# Patient Record
Sex: MALE | Race: ASIAN | NOT HISPANIC OR LATINO | Employment: UNEMPLOYED | URBAN - METROPOLITAN AREA
[De-identification: names, ages, dates, MRNs, and addresses within clinical notes are randomized per-mention and may not be internally consistent; named-entity substitution may affect disease eponyms.]

---

## 2017-01-06 ENCOUNTER — COMMUNICATION - HEALTHEAST (OUTPATIENT)
Dept: NURSING | Facility: CLINIC | Age: 22
End: 2017-01-06

## 2017-01-09 ENCOUNTER — COMMUNICATION - HEALTHEAST (OUTPATIENT)
Dept: NURSING | Facility: CLINIC | Age: 22
End: 2017-01-09

## 2017-01-09 ENCOUNTER — COMMUNICATION - HEALTHEAST (OUTPATIENT)
Dept: CARE COORDINATION | Facility: CLINIC | Age: 22
End: 2017-01-09

## 2017-01-09 DIAGNOSIS — Z71.89 COUNSELING AND COORDINATION OF CARE: ICD-10-CM

## 2017-01-13 ENCOUNTER — COMMUNICATION - HEALTHEAST (OUTPATIENT)
Dept: NURSING | Facility: CLINIC | Age: 22
End: 2017-01-13

## 2017-01-13 ENCOUNTER — AMBULATORY - HEALTHEAST (OUTPATIENT)
Dept: CARE COORDINATION | Facility: CLINIC | Age: 22
End: 2017-01-13

## 2017-01-13 ENCOUNTER — COMMUNICATION - HEALTHEAST (OUTPATIENT)
Dept: CARE COORDINATION | Facility: CLINIC | Age: 22
End: 2017-01-13

## 2017-01-17 ENCOUNTER — COMMUNICATION - HEALTHEAST (OUTPATIENT)
Dept: CARE COORDINATION | Facility: CLINIC | Age: 22
End: 2017-01-17

## 2017-01-19 ENCOUNTER — COMMUNICATION - HEALTHEAST (OUTPATIENT)
Dept: CARE COORDINATION | Facility: CLINIC | Age: 22
End: 2017-01-19

## 2017-01-20 ENCOUNTER — COMMUNICATION - HEALTHEAST (OUTPATIENT)
Dept: CARE COORDINATION | Facility: CLINIC | Age: 22
End: 2017-01-20

## 2017-01-23 ENCOUNTER — COMMUNICATION - HEALTHEAST (OUTPATIENT)
Dept: CARE COORDINATION | Facility: CLINIC | Age: 22
End: 2017-01-23

## 2017-01-26 ENCOUNTER — COMMUNICATION - HEALTHEAST (OUTPATIENT)
Dept: CARE COORDINATION | Facility: CLINIC | Age: 22
End: 2017-01-26

## 2017-01-27 ENCOUNTER — COMMUNICATION - HEALTHEAST (OUTPATIENT)
Dept: NURSING | Facility: CLINIC | Age: 22
End: 2017-01-27

## 2017-01-27 ENCOUNTER — COMMUNICATION - HEALTHEAST (OUTPATIENT)
Dept: CARE COORDINATION | Facility: CLINIC | Age: 22
End: 2017-01-27

## 2017-01-31 ENCOUNTER — AMBULATORY - HEALTHEAST (OUTPATIENT)
Dept: CARE COORDINATION | Facility: CLINIC | Age: 22
End: 2017-01-31

## 2017-03-13 ENCOUNTER — COMMUNICATION - HEALTHEAST (OUTPATIENT)
Dept: NURSING | Facility: CLINIC | Age: 22
End: 2017-03-13

## 2017-04-21 ENCOUNTER — COMMUNICATION - HEALTHEAST (OUTPATIENT)
Dept: NURSING | Facility: CLINIC | Age: 22
End: 2017-04-21

## 2017-05-03 ENCOUNTER — COMMUNICATION - HEALTHEAST (OUTPATIENT)
Dept: NURSING | Facility: CLINIC | Age: 22
End: 2017-05-03

## 2017-07-14 ENCOUNTER — COMMUNICATION - HEALTHEAST (OUTPATIENT)
Dept: NURSING | Facility: CLINIC | Age: 22
End: 2017-07-14

## 2017-09-07 ENCOUNTER — OFFICE VISIT - HEALTHEAST (OUTPATIENT)
Dept: FAMILY MEDICINE | Facility: CLINIC | Age: 22
End: 2017-09-07

## 2017-09-07 DIAGNOSIS — F15.20 METHAMPHETAMINE USE DISORDER, MODERATE, DEPENDENCE (H): ICD-10-CM

## 2017-09-07 DIAGNOSIS — B19.10 HEPATITIS B: ICD-10-CM

## 2017-09-07 DIAGNOSIS — F12.10 CANNABIS ABUSE: ICD-10-CM

## 2017-09-07 DIAGNOSIS — F19.951: ICD-10-CM

## 2017-09-07 DIAGNOSIS — F19.20 POLYSUBSTANCE DEPENDENCE (H): ICD-10-CM

## 2017-09-07 ASSESSMENT — MIFFLIN-ST. JEOR: SCORE: 1342.06

## 2017-12-20 ENCOUNTER — DOCUMENTATION ONLY (OUTPATIENT)
Dept: PSYCHOLOGY | Facility: CLINIC | Age: 22
End: 2017-12-20

## 2017-12-20 ENCOUNTER — OFFICE VISIT (OUTPATIENT)
Dept: FAMILY MEDICINE | Facility: CLINIC | Age: 22
End: 2017-12-20
Payer: COMMERCIAL

## 2017-12-20 VITALS
WEIGHT: 116.8 LBS | BODY MASS INDEX: 21.49 KG/M2 | DIASTOLIC BLOOD PRESSURE: 89 MMHG | HEART RATE: 106 BPM | HEIGHT: 62 IN | TEMPERATURE: 97.8 F | SYSTOLIC BLOOD PRESSURE: 126 MMHG

## 2017-12-20 DIAGNOSIS — F20.9 SCHIZOPHRENIA, UNSPECIFIED TYPE (H): ICD-10-CM

## 2017-12-20 DIAGNOSIS — Z00.00 ROUTINE GENERAL MEDICAL EXAMINATION AT A HEALTH CARE FACILITY: Primary | ICD-10-CM

## 2017-12-20 LAB
% GRANULOCYTES: 75.4 %G (ref 40–75)
ALBUMIN SERPL-MCNC: 4.6 MG/DL (ref 3.9–5.1)
ALP SERPL-CCNC: 67.3 U/L (ref 40–150)
ALT SERPL-CCNC: 25.9 U/L (ref 0–45)
AST SERPL-CCNC: 26.3 U/L (ref 0–55)
BILIRUB SERPL-MCNC: 0.4 MG/DL (ref 0.2–1.3)
BUN SERPL-MCNC: 14.4 MG/DL (ref 7–21)
CALCIUM SERPL-MCNC: 9.5 MG/DL (ref 8.5–10.1)
CHLORIDE SERPLBLD-SCNC: 103.6 MMOL/L (ref 98–110)
CHOLEST SERPL-MCNC: 105.9 MG/DL (ref 0–200)
CHOLEST/HDLC SERPL: 2.9 {RATIO} (ref 0–5)
CO2 SERPL-SCNC: 29.2 MMOL/L (ref 20–32)
CREAT SERPL-MCNC: 1.2 MG/DL (ref 0.7–1.3)
GFR SERPL CREATININE-BSD FRML MDRD: 80.5 ML/MIN/1.7 M2
GLUCOSE SERPL-MCNC: 79.6 MG'DL (ref 70–99)
GRANULOCYTES #: 5.1 K/UL (ref 1.6–8.3)
HCT VFR BLD AUTO: 52.4 % (ref 40–53)
HDLC SERPL-MCNC: 36.4 MG/DL
HEMOGLOBIN: 16 G/DL (ref 13.3–17.7)
LDLC SERPL CALC-MCNC: 55 MG/DL (ref 0–129)
LYMPHOCYTES # BLD AUTO: 1.3 K/UL (ref 0.8–5.3)
LYMPHOCYTES NFR BLD AUTO: 18.5 %L (ref 20–48)
MCH RBC QN AUTO: 23.3 PG (ref 26.5–35)
MCHC RBC AUTO-ENTMCNC: 30.5 G/DL (ref 32–36)
MCV RBC AUTO: 76.2 FL (ref 78–100)
MID #: 0.4 K/UL (ref 0–2.2)
MID %: 6.1 %M (ref 0–20)
PLATELET # BLD AUTO: 381 K/UL (ref 150–450)
POTASSIUM SERPL-SCNC: 4.3 MMOL/DL (ref 3.2–4.6)
PROT SERPL-MCNC: 7.3 G/DL (ref 6.8–8.8)
RBC # BLD AUTO: 6.9 M/UL (ref 4.4–5.9)
SODIUM SERPL-SCNC: 139.8 MMOL/L (ref 132–142)
TRIGL SERPL-MCNC: 73.3 MG/DL (ref 0–150)
TSH SERPL DL<=0.05 MIU/L-ACNC: 0.68 UIU/ML (ref 0.3–5)
VLDL CHOLESTEROL: 14.7 MG/DL (ref 7–32)
WBC # BLD AUTO: 6.8 K/UL (ref 4–11)

## 2017-12-20 RX ORDER — ARIPIPRAZOLE 15 MG/1
15 TABLET ORAL AT BEDTIME
Qty: 30 TABLET | Refills: 0 | Status: SHIPPED | OUTPATIENT
Start: 2017-12-20 | End: 2020-11-10

## 2017-12-20 NOTE — LETTER
December 21, 2017      Moose Laurarodrigo [facility] [address]        Dear Moose,    Please see below for your test results.  state that your labs are normal. We will continue to monitor these levels while taking certain meds. Please call us with any questions     Resulted Orders   CBC with Diff Plt ([facility])   Result Value Ref Range    WBC 6.8 4.0 - 11.0 K/uL    Lymphocytes # 1.3 0.8 - 5.3 K/uL    % Lymphocytes 18.5 (L) 20.0 - 48.0 %L    Mid # 0.4 0.0 - 2.2 K/uL    Mid % 6.1 0.0 - 20.0 %M    GRANULOCYTES # 5.1 1.6 - 8.3 K/uL    % Granulocytes 75.4 (H) 40.0 - 75.0 %G    RBC 6.9 (H) 4.4 - 5.9 M/uL    Hemoglobin 16.0 13.3 - 17.7 g/dL    Hematocrit 52.4 40.0 - 53.0 %    MCV 76.2 (L) 78.0 - 100.0 fL    MCH 23.3 (L) 26.5 - 35.0    MCHC 30.5 (L) 32.0 - 36.0 g/dL    Platelets 381.0 150.0 - 450.0 K/uL   Thyroid [place] ([facility])   Result Value Ref Range    TSH 0.68 0.30 - 5.00 uIU/mL    Narrative    Test performed by:  [facility] [address] [place] [address]   Lipid Panel ([facility])   Result Value Ref Range    Cholesterol 105.9 0.0 - 200.0 mg/dL    Cholesterol/HDL Ratio 2.9 0.0 - 5.0    HDL Cholesterol 36.4 (L) >40.0 mg/dL    LDL Cholesterol Calculated 55 0 - 129 mg/dL    Triglycerides 73.3 0.0 - 150.0 mg/dL    VLDL Cholesterol 14.7 7.0 - 32.0 mg/dL   Comprehensive Metabolic Panel ([place])   Result Value Ref Range    Albumin 4.6 3.9 - 5.1 mg/dL    Alkaline Phosphatase 67.3 40.0 - 150.0 U/L    ALT 25.9 0.0 - 45.0 U/L    AST 26.3 0.0 - 55.0 U/L    Bilirubin Total 0.4 0.2 - 1.3 mg/dL    Urea Nitrogen 14.4 7.0 - 21.0 mg/dL    Calcium 9.5 8.5 - 10.1 mg/dL    Chloride 103.6 98.0 - 110.0 mmol/L    Carbon Dioxide 29.2 20.0 - 32.0 mmol/L    Creatinine 1.2 0.7 - 1.3 mg/dL    Glucose 79.6 70.0 - 99.0 mg'dL    Potassium 4.3 3.2 - 4.6 mmol/dL    Sodium 139.8 132.0 - 142.0 mmol/L    Protein Total 7.3 6.8 - 8.8 g/dL    GFR Estimate 80.5 >60.0 mL/min/1.7 m2    GFR Estimate If Black >90 >60.0 mL/min/1.7 m2        If you have any questions, please call the clinic to make an appointment.    Sincerely,    Tim Chowdhury, DO

## 2017-12-20 NOTE — PROGRESS NOTES
Preceptor attestation:  Patient seen and discussed with the resident. Assessment and plan reviewed with resident and agreed upon.  Supervising physician: Adryan Pang  [facility]
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
## 2017-12-20 NOTE — MR AVS SNAPSHOT
After Visit Summary   12/20/2017    Moose Sanchez    MRN: [id number]           Patient Information     Date Of Birth          1995        Visit Information        Provider Department      12/20/2017 8:00 AM Tim Chowdhury DO [facility]        Today's Diagnoses     Routine general medical examination at a health care facility    -  1    Schizophrenia, unspecified type (H)          Care Instructions      Preventive Health Recommendations  Male Ages 18 - 25     Yearly exam:             See your health care provider every year in order to  o   Review health changes.   o   Discuss preventive care.    o   Review your medicines if your doctor has prescribed any.    You should be tested each year for STDs (sexually transmitted diseases).     Talk to your provider about cholesterol testing.      If you are at risk for diabetes, you should have a diabetes test (fasting glucose).    Shots: Get a flu shot each year. Get a tetanus shot every 10 years.     Nutrition:    Eat at least 5 servings of fruits and vegetables daily.     Eat whole-grain bread, whole-wheat pasta and brown rice instead of white grains and rice.     Talk to your provider about calcium and Vitamin D.     Lifestyle    Exercise for at least 150 minutes a week (30 minutes a day, 5 days a week). This will help you control your weight and prevent disease.     Limit alcohol to one drink per day.     No smoking.     Wear sunscreen to prevent skin cancer.     See your dentist every six months for an exam and cleaning.             Follow-ups after your visit        Additional Services     MENTAL HEALTH REFERRAL  -       Use this form for behavioral health consults and assessments. The referral coordinator will help to determine whether patients are best served by clinic behavioral health staff or by community providers.    Presenting Problem: Schizophrenia vs. Psychotic disorder. Patient is followed by Dr. Yusuf with [facility], rereferral back to  "[facility]    Currently having suicidal thoughts: No  Previous psych hospitalization: Yes    Type of referral(s) requested (indicate all that apply):  Adult Psychiatry--for long term medication management., needing a higher level of care than primary care can provide        needed:Yes  Language: Eve                  Who to contact     Please call your clinic at [phone] to:    Ask questions about your health    Make or cancel appointments    Discuss your medicines    Learn about your test results    Speak to your doctor   If you have compliments or concerns about an experience at your clinic, or if you wish to file a complaint, please contact [facility] Patient Relations at [phone] or email us at [email or web address]         Additional Information About Your Visit        5app Information     5app is an electronic gateway that provides easy, online access to your medical records. With 5app, you can request a clinic appointment, read your test results, renew a prescription or communicate with your care team.     To sign up for 5app visit the website at [email or web address]   You will be asked to enter the access code listed below, as well as some personal information. Please follow the directions to create your username and password.     Your access code is: [email or web address]  Expires: 3/20/2018  9:04 AM     Your access code will  in 90 days. If you need help or a new code, please contact your [facility] or call [phone] for assistance.        Care EveryWhere ID     This is your Care EveryWhere ID. This could be used by other organizations to access your [facility] medical records  [id number]        Your Vitals Were     Pulse Temperature Height BMI (Body Mass Index)          106 97.8  F (36.6  C) (Oral) 5' 1.81\" (157 cm) 21.49 kg/m2         Blood Pressure from Last 3 Encounters:   17 126/89    Weight from " Last 3 Encounters:   12/20/17 116 lb 12.8 oz (53 kg)              We Performed the Following     CBC with Diff Plt ([facility])     Comprehensive Metabolic Panel ([place])     Lipid Panel ([facility])     MENTAL HEALTH REFERRAL  -     Thyroid [place] ([facility])          Today's Medication Changes          These changes are accurate as of: 12/20/17  9:04 AM.  If you have any questions, ask your nurse or doctor.               Start taking these medicines.        Dose/Directions    ARIPiprazole 15 MG tablet   Commonly known as:  ABILIFY   Used for:  Schizophrenia, unspecified type (H)   Started by:  Tim Chowdhury DO        Dose:  15 mg   Take 1 tablet (15 mg) by mouth At Bedtime   Quantity:  30 tablet   Refills:  0            Where to get your medicines      These medications were sent to [facility] - [place], [place] - [address], [place] [address]     Phone:  [phone]     ARIPiprazole 15 MG tablet                Primary Care Provider Office Phone # Fax #    Tim Chowdhury [phone]       [address] [address]        Equal Access to Services     FANNY COLLAZO : Hadii joel gamez hadasho Soomaali, waaxda luqadaha, qaybta kaalmada adejaneen, lynn gomes. So [facility] [phone].    ATENCIÓN: Si habla español, tiene a nesbitt disposición servicios gratuitos de asistencia lingüística. Leeann al [phone].    We comply with applicable federal civil rights laws and [place] laws. We do not discriminate on the basis of race, color, national origin, age, disability, sex, sexual orientation, or gender identity.            Thank you!     Thank you for choosing [facility]  for your care. Our goal is always to provide you with excellent care. Hearing back from our patients is one way we can continue to improve our services. Please take a few minutes to complete the written survey that you may receive in the mail after  your visit with us. Thank you!             Your Updated Medication List - Protect others around you: Learn how to safely use, store and throw away your medicines at www.disposemymeds.org.          This list is accurate as of: 12/20/17  9:04 AM.  Always use your most recent med list.                   Brand Name Dispense Instructions for use Diagnosis    ARIPiprazole 15 MG tablet    ABILIFY    30 tablet    Take 1 tablet (15 mg) by mouth At Bedtime    Schizophrenia, unspecified type (H)

## 2017-12-20 NOTE — PATIENT INSTRUCTIONS
Preventive Health Recommendations  Male Ages 18 - 25     Yearly exam:             See your health care provider every year in order to  o   Review health changes.   o   Discuss preventive care.    o   Review your medicines if your doctor has prescribed any.    You should be tested each year for STDs (sexually transmitted diseases).     Talk to your provider about cholesterol testing.      If you are at risk for diabetes, you should have a diabetes test (fasting glucose).    Shots: Get a flu shot each year. Get a tetanus shot every 10 years.     Nutrition:    Eat at least 5 servings of fruits and vegetables daily.     Eat whole-grain bread, whole-wheat pasta and brown rice instead of white grains and rice.     Talk to your provider about calcium and Vitamin D.     Lifestyle    Exercise for at least 150 minutes a week (30 minutes a day, 5 days a week). This will help you control your weight and prevent disease.     Limit alcohol to one drink per day.     No smoking.     Wear sunscreen to prevent skin cancer.     See your dentist every six months for an exam and cleaning.   See documentation encounter for details on mental health referral.  Mallory  12/20/17

## 2017-12-20 NOTE — PROGRESS NOTES
"Given challenges getting this patient connected to psychiatry in the community, I am wondering if we need to connect him to [facility] ([phone]).   Did call there for consultation on this possible referral.  Spoke with Consuelo.  She was initially unsure if they could take this referral and noted they would need advanced notice to schedule an  if he were to be seen at the Urgent Care Center itself.  Shared concerns related to recent hospitalization for homicidal ideation and psychotic symptoms.  Noted he was on a wait list for psychiatry at [facility] but this could be up to 4 months away.  Ultimately, per our phone consultation today, we decided it would be best if they provide a \"service offering\" in which they see the patient in his home, triage the situation and develop a care plan, but this will likely not happen until after Yasmani.  Agreed he may benefit from mental health case management if they could assist him in getting connected to this service.Thanked Consuelo for her willingness to reach out to the patient and his family and provided contact information for the patient and his family (including Dad's name).  Asked for call back on disposition if possible and Consuelo agreed to update us or let us know if they had difficulty reaching the patient.      "

## 2017-12-20 NOTE — PROGRESS NOTES
This patient was being referred to [facility] for psychiatry, however, when I called, they state that because he has not been seen in over a year, he would be considered a new patient. They are not accepting new patients. Also, they noted that patient had 5 no shows with them as well.     I have called [facility], patient is unable to be seen with them due to too many no shows.     I then called [facility], patient has been placed on a wait list for psychiatry (2-4 months out). I have also left a message for their diagnostic assessment schedulers to get that going in the meantime.     I am thinking this patient would benefit greatly from more support from [facility] in addition to these services as it seems that there have been many barriers from getting to appointments and being constant with mental health services. Please advise on this.    Mallory  12/20/17

## 2017-12-21 NOTE — PROGRESS NOTES
Received call back from Consuelo that she was able to identify that he has a mental health , John Gallagher ([phone]) via [facility].  I will add this provider to the care team and note in the snapshot that we should get [facility] for this provider ASAP.  Consuelo reported in her message that they would still do outreach to Mr. Sanchez, but recommended coordinating our efforts to connect him to psychiatry with his current .

## 2018-01-02 PROBLEM — B18.1 CHRONIC VIRAL HEPATITIS B WITHOUT DELTA AGENT AND WITHOUT COMA (H): Status: ACTIVE | Noted: 2018-01-02

## 2018-02-15 ENCOUNTER — OFFICE VISIT (OUTPATIENT)
Dept: FAMILY MEDICINE | Facility: CLINIC | Age: 23
End: 2018-02-15
Payer: COMMERCIAL

## 2018-02-15 VITALS
DIASTOLIC BLOOD PRESSURE: 78 MMHG | HEART RATE: 106 BPM | TEMPERATURE: 98 F | SYSTOLIC BLOOD PRESSURE: 120 MMHG | HEIGHT: 62 IN | WEIGHT: 118.6 LBS | BODY MASS INDEX: 21.83 KG/M2 | OXYGEN SATURATION: 99 %

## 2018-02-15 DIAGNOSIS — T33.90XA FROSTBITE, INITIAL ENCOUNTER: Primary | ICD-10-CM

## 2018-02-15 DIAGNOSIS — Z23 NEED FOR VACCINATION: ICD-10-CM

## 2018-02-15 RX ORDER — IBUPROFEN 400 MG/1
400 TABLET, FILM COATED ORAL EVERY 6 HOURS PRN
Qty: 120 TABLET | Refills: 1 | Status: SHIPPED | OUTPATIENT
Start: 2018-02-15 | End: 2020-11-10
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
## 2018-02-15 NOTE — PROGRESS NOTES
"Nursing Notes:   Carissa Son CMA  2/15/2018  1:18 PM  Addendum   name: Aayush Pollack  Language: Eve  Agency: [employer]  Phone number: [phone]      Injectable Influenza Immunization Documentation    1.  Has the patient received the information for the injectable influenza vaccine? YES     2. Is the patient 6 months of age or older? YES     3. Does the patient have any of the following contraindications?         Severe allergy to eggs? No     Severe allergic reaction to previous influenza vaccines? No   Severe allergy to latex? No       History of Guillain-[place] syndrome? No     Currently have a temperature greater than 100.4F? No        4.  Severely egg allergic patients should have flu vaccine eligibility assessed by an MD, RN, or pharmacist, and those who received flu vaccine should be observed for 15 min by an MD, RN, Pharmacist, Medical Technician, or member of clinic staff.\": YES    5. Latex-allergic patients should be given latex-free influenza vaccine Yes. Please reference the Vaccine latex table to determine if your clinic s product is latex-containing.       Vaccination given by BERLIN Martinez                  Chief Complaint   Patient presents with     other     index and middle fingers on the left hand are swelling and it's itching pain per patient.      Blood pressure 120/78, pulse 106, temperature 98  F (36.7  C), temperature source Oral, height 5' 1.5\" (1.562 m), weight 118 lb 9.6 oz (53.8 kg), SpO2 99 %.    Assessment and Plan   Frostbite: We are roughly 2 weeks out from initial injury.  Patient is not having significant pain at this time but he can take ibuprofen as needed for that.  Additionally he can apply aloe vera  topically.  We will refer him to the burn clinic at either  [facility] or head opinion to see if any interventions could be offered.    Options for treatment and follow-up care were reviewed with the patient and/or guardian. Moose Sanchez and/or guardian engaged in the " decision making process and verbalized understanding of the options discussed and agreed with the final plan.  Patient discussed with Dr. Briggs.   Sudarshan LINDSAY Rutledge, DO         JOHN       Moose Sanchez is a 22 year old  male S/P who presents today for swelling of the fingers.  Patient notes that his index finger tip and middle fingertip have been discolored and swollen for a few weeks.  He reports that one night is cold outside he was walking for an hour and while walking to his destination he developed the skin changes and swelling in the 2 fingers on the left hand as noted above.  He was not wearing gloves.  He noted swelling pain and then subsequently itching.  He did not have any drainage from the areas.  He has not taken anything for pain.  No fevers chills.  His pain is actually markedly improved and the most concerning thing to him is itching over these digits today.    Patient Active Problem List   Diagnosis     Schizophrenia (H)     Chronic viral hepatitis B without delta agent and without coma (H)     Current Outpatient Prescriptions   Medication Sig Dispense Refill     ibuprofen (ADVIL/MOTRIN) 400 MG tablet Take 1 tablet (400 mg) by mouth every 6 hours as needed for moderate pain 120 tablet 1     Aloe Vera 90 % GEL Externally apply topically 3 times daily Please substitute with alternative if not covered by insurance 1 Bottle 1     ARIPiprazole (ABILIFY) 15 MG tablet Take 1 tablet (15 mg) by mouth At Bedtime 30 tablet 0     No Known Allergies  Family History   Problem Relation Age of Onset     DIABETES No family hx of      Coronary Artery Disease No family hx of      Other Cancer No family hx of      CANCER No family hx of      HEART DISEASE No family hx of      No results found for this or any previous visit (from the past 24 hour(s)).         Review of Systems:   As Above             Physical Exam:     Vitals:    02/15/18 1121   BP: 120/78   Pulse: 106   Temp: 98  F (36.7  C)   TempSrc: Oral   SpO2: 99%  "  Weight: 118 lb 9.6 oz (53.8 kg)   Height: 5' 1.5\" (1.562 m)     Body mass index is 22.05 kg/(m^2).    MSK: Fingertips in the index and middle finger and to a lesser extent 4th digit of the left hand with a mild amount of swelling in blue to black discoloration noted.  Digits are not significantly cooler than other digits.  He does not have pain on palpation of these areas.  He is able to flex and extend at the DIP joints of these fingers without significant difficulty.    Office Visit on 12/20/2017   Component Date Value Ref Range Status     WBC 12/20/2017 6.8  4.0 - 11.0 K/uL Final     Lymphocytes # 12/20/2017 1.3  0.8 - 5.3 K/uL Final     % Lymphocytes 12/20/2017 18.5* 20.0 - 48.0 %L Final     Mid # 12/20/2017 0.4  0.0 - 2.2 K/uL Final     Mid % 12/20/2017 6.1  0.0 - 20.0 %M Final     GRANULOCYTES # 12/20/2017 5.1  1.6 - 8.3 K/uL Final     % Granulocytes 12/20/2017 75.4* 40.0 - 75.0 %G Final     RBC 12/20/2017 6.9* 4.4 - 5.9 M/uL Final     Hemoglobin 12/20/2017 16.0  13.3 - 17.7 g/dL Final     Hematocrit 12/20/2017 52.4  40.0 - 53.0 % Final     MCV 12/20/2017 76.2* 78.0 - 100.0 fL Final     MCH 12/20/2017 23.3* 26.5 - 35.0 Final     MCHC 12/20/2017 30.5* 32.0 - 36.0 g/dL Final     Platelets 12/20/2017 381.0  150.0 - 450.0 K/uL Final     TSH 12/20/2017 0.68  0.30 - 5.00 uIU/mL Final     Cholesterol 12/20/2017 105.9  0.0 - 200.0 mg/dL Final     Cholesterol/HDL Ratio 12/20/2017 2.9  0.0 - 5.0 Final     HDL Cholesterol 12/20/2017 36.4* >40.0 mg/dL Final     LDL Cholesterol Calculated 12/20/2017 55  0 - 129 mg/dL Final     Triglycerides 12/20/2017 73.3  0.0 - 150.0 mg/dL Final     VLDL Cholesterol 12/20/2017 14.7  7.0 - 32.0 mg/dL Final     Albumin 12/20/2017 4.6  3.9 - 5.1 mg/dL Final     Alkaline Phosphatase 12/20/2017 67.3  40.0 - 150.0 U/L Final     ALT 12/20/2017 25.9  0.0 - 45.0 U/L Final     AST 12/20/2017 26.3  0.0 - 55.0 U/L Final     Bilirubin Total 12/20/2017 0.4  0.2 - 1.3 mg/dL Final     Urea Nitrogen " 12/20/2017 14.4  7.0 - 21.0 mg/dL Final     Calcium 12/20/2017 9.5  8.5 - 10.1 mg/dL Final     Chloride 12/20/2017 103.6  98.0 - 110.0 mmol/L Final     Carbon Dioxide 12/20/2017 29.2  20.0 - 32.0 mmol/L Final     Creatinine 12/20/2017 1.2  0.7 - 1.3 mg/dL Final     Glucose 12/20/2017 79.6  70.0 - 99.0 mg'dL Final     Potassium 12/20/2017 4.3  3.2 - 4.6 mmol/dL Final     Sodium 12/20/2017 139.8  132.0 - 142.0 mmol/L Final     Protein Total 12/20/2017 7.3  6.8 - 8.8 g/dL Final     GFR Estimate 12/20/2017 80.5  >60.0 mL/min/1.7 m2 Final     GFR Estimate If Black 12/20/2017 >90  >60.0 mL/min/1.7 m2 Final

## 2018-02-15 NOTE — PROGRESS NOTES
Preceptor attestation:  Patient seen and discussed with the resident. Assessment and plan reviewed with resident and agreed upon.  Supervising physician: Manuela Briggs  [facility]

## 2018-02-15 NOTE — PATIENT INSTRUCTIONS
- can apply aloe vera gel three times a day and take 400 mg ibuprofen as needed   - recommend following up here after burn clinic    [facility]   [phone]   Fax:[phone]  *I've spoken with the clinic and they would like to review referral and notes first. They will contact the patient to schedule then call me and let me know when so I can schedule a ride for them.  Mallory  02/20/18      [facility] has made several attempts to reach patient with no response.  Message sent to provider.  Mallory  02/23/18

## 2018-02-15 NOTE — NURSING NOTE
" name: Aayush Izaiah Pollack  Language: Eve  Agency: [employer]  Phone number: [phone]      Injectable Influenza Immunization Documentation    1.  Has the patient received the information for the injectable influenza vaccine? YES     2. Is the patient 6 months of age or older? YES     3. Does the patient have any of the following contraindications?         Severe allergy to eggs? No     Severe allergic reaction to previous influenza vaccines? No   Severe allergy to latex? No       History of Guillain-[place] syndrome? No     Currently have a temperature greater than 100.4F? No        4.  Severely egg allergic patients should have flu vaccine eligibility assessed by an MD, RN, or pharmacist, and those who received flu vaccine should be observed for 15 min by an MD, RN, Pharmacist, Medical Technician, or member of clinic staff.\": YES    5. Latex-allergic patients should be given latex-free influenza vaccine Yes. Please reference the Vaccine latex table to determine if your clinic s product is latex-containing.       Vaccination given by November Paw, RMA                  "

## 2019-06-19 ENCOUNTER — COMMUNICATION - HEALTHEAST (OUTPATIENT)
Dept: FAMILY MEDICINE | Facility: CLINIC | Age: 24
End: 2019-06-19

## 2019-06-21 ENCOUNTER — COMMUNICATION - HEALTHEAST (OUTPATIENT)
Dept: CARE COORDINATION | Facility: CLINIC | Age: 24
End: 2019-06-21

## 2020-09-11 ENCOUNTER — OFFICE VISIT (OUTPATIENT)
Dept: NEUROLOGY | Facility: CLINIC | Age: 25
End: 2020-09-11
Payer: COMMERCIAL

## 2020-09-11 VITALS
SYSTOLIC BLOOD PRESSURE: 120 MMHG | HEIGHT: 64 IN | HEART RATE: 94 BPM | WEIGHT: 108 LBS | DIASTOLIC BLOOD PRESSURE: 77 MMHG | BODY MASS INDEX: 18.44 KG/M2

## 2020-09-11 DIAGNOSIS — R56.9 SEIZURE (H): Primary | ICD-10-CM

## 2020-09-11 PROBLEM — F19.951 DRUG-INDUCED PSYCHOTIC DISORDER WITH HALLUCINATIONS (H): Status: ACTIVE | Noted: 2019-04-16

## 2020-09-11 PROBLEM — F12.10 CANNABIS ABUSE: Status: ACTIVE | Noted: 2019-04-16

## 2020-09-11 PROBLEM — K21.9 GASTROESOPHAGEAL REFLUX DISEASE: Status: ACTIVE | Noted: 2020-07-14

## 2020-09-11 PROBLEM — J96.00 ACUTE RESPIRATORY FAILURE (H): Status: ACTIVE | Noted: 2019-08-15

## 2020-09-11 PROBLEM — F10.20 ALCOHOL USE DISORDER, SEVERE, DEPENDENCE (H): Status: ACTIVE | Noted: 2018-07-31

## 2020-09-11 PROBLEM — B18.1 CHRONIC HEPATITIS B (H): Status: ACTIVE | Noted: 2018-01-02

## 2020-09-11 PROBLEM — F29 PSYCHOSIS (H): Status: ACTIVE | Noted: 2018-07-24

## 2020-09-11 PROBLEM — F06.30 MOOD DISORDER DUE TO A GENERAL MEDICAL CONDITION: Status: ACTIVE | Noted: 2019-08-15

## 2020-09-11 PROBLEM — F19.94 SUBSTANCE INDUCED MOOD DISORDER (H): Status: ACTIVE | Noted: 2019-08-15

## 2020-09-11 PROBLEM — R79.89 SERUM CREATININE RAISED: Status: ACTIVE | Noted: 2020-07-14

## 2020-09-11 PROBLEM — F15.10 AMPHETAMINE ABUSE (H): Status: ACTIVE | Noted: 2019-08-15

## 2020-09-11 PROCEDURE — 99215 OFFICE O/P EST HI 40 MIN: CPT | Performed by: PSYCHIATRY & NEUROLOGY

## 2020-09-11 ASSESSMENT — MIFFLIN-ST. JEOR: SCORE: 1390.88

## 2020-09-11 NOTE — LETTER
9/11/2020         RE: Moose Sanchez  [address] [place] [address]        Dear Colleague,    Thank you for referring your patient, Moose Sanchez, to the [facility] [place]. Please see a copy of my visit note below.    In person neurologic evaluation  Patient comes with an   Patient really does not know why he is here  Patient does not know what meds he is on but thinks he is on 2 meds  Cannot really give much history or insight into his problems        HPI  24-year-old who on August 8, 2020 had an episode of shaking per his family  EMS transported him to the ER  In the ER he was amnestic for the event  He not had any recent substance abuse  No recent illness  No significant sleep deprivation    His primary did a Keppra level about a week later on August 14, 2020 that was 8.9        Patient who previously been seen in June 2019 a year ago in hospital  Had 4 seizures during that hospitalization thought to be triggered by polysubstance abuse  Was supposedly discharged on Keppra thousand milligrams twice daily  Never came back for follow-up    Patient is a poor historian    Does not recall what meds he is on at this time    Per chart he is on Keppra thousand milligrams twice daily        Past medical history  Psych history with psychosis  Polysubstance abuse (alcohol, methamphetamines, marijuana)  History of hepatitis B  First-time seizure times 4 June 2019 hospitalized at [facility]  4 beers per week  2 shots per week  Smokes cigarettes maybe 3 cigarettes/day  Denies any drug abuse at this time        Work-up June 2019  CT scan head June 2018 normal  MRI scan brain June 2018 with and without contrast normal  EEG June 2019 intubated sedated 3-4 Hz and 5-6 Hz diffuse slowing no epileptiform activity  CSF June 2019, WBC 3/2, RBC 0/0, protein 21, glucose 84, PCR negative for HSV, varicella, enterovirus      Recent work-up August 2020  Keppra level 8.98 August 14, 2020  TSH  1.03  Labs August 8, 2020  Sodium 137 potassium 3.9 BUN 6 creatinine 1.38  Glucose 147  AST 26  White blood count 8.3, hemoglobin 13.9, platelets 346,000      Exam     Current review of systems  No headache no chest pain no shortness of breath no nausea vomiting no diarrhea no fever chills no abdominal pain    No diplopia dysarthria dysphasia  No focal weakness numbness or tingling  No trouble with gait    Otherwise review systems negative              Vital signs stable  Alert oriented x3 through the   HEENT normal no signs of trauma  Lungs clear  Heart rate regular  Abdomen soft  Symmetrical pulses  No edema the feet    Neurologic exam with the   Alert oriented x3  Normal naming normal comprehension normal repetition  No aphasia  Seems to have normal prosody of speech in his own language  No neglect  Memory and recall are okay    Cranial nerves II through XII normal  Pupils round reactive to light symmetrical  Visual fields intact  No ophthalmoplegia  No nystagmus  Face symmetrical  Tongue in midline    Upper extremities  No drift  No tremor  Normal finger-nose    Lower extremities  Distal proximal strength normal    Reflexes symmetrical  Toes downgoing    Gait  Normal gait  Romberg negative        Impression    1.  First-time seizure June 2019 presented with 4 seizures respiratory difficulty thought to be triggered by may be methamphetamine abuse at the time.    2.  Started on Keppra 1000 mg twice daily 8 June 2019    3.  Supposedly had a shaking spell amnestic for the event August 8, 2020 breakthrough seizure, Keppra level not done until a week later though and was 8.9    Patient states that he does have a pillbox to remember his meds  Does not think that he forgot meds  Patient though is very unaware of his diagnoses and the exact medicines that he uses    Question if he forgot meds and had a breakthrough when she continue with the same dose or if he needs elevation of his dosage at this  time    Recommend    Recheck EEG previous EEG during diagnosis was done while he was on the vent and sedated in 2019  Check a Keppra level again to see that he is still taking the meds if the level has continued to rise then possibly he had forgotten his meds    Continue the Keppra 1000 mg twice daily which she is already on through his primary    Follow-up after the blood test and EEG is performed    45 minutes total care time today with greater than 50% face-to-face patient and  reviewing the above but also reviewed outside records in EMR and past work-up.      Again, thank you for allowing me to participate in the care of your patient.        Sincerely,        Howard Ennis MD

## 2020-09-11 NOTE — NURSING NOTE
Chief Complaint   Patient presents with     Consult     seizures  f/u [facility] seizure     With  [facility]- if need.    Sowmya Valderrama, ATC

## 2020-09-11 NOTE — PROGRESS NOTES
In person neurologic evaluation  Patient comes with an   Patient really does not know why he is here  Patient does not know what meds he is on but thinks he is on 2 meds  Cannot really give much history or insight into his problems        HPI  24-year-old who on August 8, 2020 had an episode of shaking per his family  EMS transported him to the ER  In the ER he was amnestic for the event  He not had any recent substance abuse  No recent illness  No significant sleep deprivation    His primary did a Keppra level about a week later on August 14, 2020 that was 8.9        Patient who previously been seen in June 2019 a year ago in hospital  Had 4 seizures during that hospitalization thought to be triggered by polysubstance abuse  Was supposedly discharged on Keppra thousand milligrams twice daily  Never came back for follow-up    Patient is a poor historian    Does not recall what meds he is on at this time    Per chart he is on Keppra thousand milligrams twice daily        Past medical history  Psych history with psychosis  Polysubstance abuse (alcohol, methamphetamines, marijuana)  History of hepatitis B  First-time seizure times 4 June 2019 hospitalized at [facility]  4 beers per week  2 shots per week  Smokes cigarettes maybe 3 cigarettes/day  Denies any drug abuse at this time        Work-up June 2019  CT scan head June 2018 normal  MRI scan brain June 2018 with and without contrast normal  EEG June 2019 intubated sedated 3-4 Hz and 5-6 Hz diffuse slowing no epileptiform activity  CSF June 2019, WBC 3/2, RBC 0/0, protein 21, glucose 84, PCR negative for HSV, varicella, enterovirus      Recent work-up August 2020  Keppra level 8.98 August 14, 2020  TSH 1.03  Labs August 8, 2020  Sodium 137 potassium 3.9 BUN 6 creatinine 1.38  Glucose 147  AST 26  White blood count 8.3, hemoglobin 13.9, platelets 346,000      Exam     Current review of systems  No headache no chest pain no shortness of breath  no nausea vomiting no diarrhea no fever chills no abdominal pain    No diplopia dysarthria dysphasia  No focal weakness numbness or tingling  No trouble with gait    Otherwise review systems negative              Vital signs stable  Alert oriented x3 through the   HEENT normal no signs of trauma  Lungs clear  Heart rate regular  Abdomen soft  Symmetrical pulses  No edema the feet    Neurologic exam with the   Alert oriented x3  Normal naming normal comprehension normal repetition  No aphasia  Seems to have normal prosody of speech in his own language  No neglect  Memory and recall are okay    Cranial nerves II through XII normal  Pupils round reactive to light symmetrical  Visual fields intact  No ophthalmoplegia  No nystagmus  Face symmetrical  Tongue in midline    Upper extremities  No drift  No tremor  Normal finger-nose    Lower extremities  Distal proximal strength normal    Reflexes symmetrical  Toes downgoing    Gait  Normal gait  Romberg negative        Impression    1.  First-time seizure June 2019 presented with 4 seizures respiratory difficulty thought to be triggered by may be methamphetamine abuse at the time.    2.  Started on Keppra 1000 mg twice daily 8 June 2019    3.  Supposedly had a shaking spell amnestic for the event August 8, 2020 breakthrough seizure, Keppra level not done until a week later though and was 8.9    Patient states that he does have a pillbox to remember his meds  Does not think that he forgot meds  Patient though is very unaware of his diagnoses and the exact medicines that he uses    Question if he forgot meds and had a breakthrough when she continue with the same dose or if he needs elevation of his dosage at this time    Recommend    Recheck EEG previous EEG during diagnosis was done while he was on the vent and sedated in 2019  Check a Keppra level again to see that he is still taking the meds if the level has continued to rise then possibly he had  forgotten his meds    Continue the Keppra 1000 mg twice daily which she is already on through his primary    Follow-up after the blood test and EEG is performed    45 minutes total care time today with greater than 50% face-to-face patient and  reviewing the above but also reviewed outside records in EMR and past work-up.

## 2020-11-10 ENCOUNTER — RECORDS - HEALTHEAST (OUTPATIENT)
Dept: LAB | Facility: HOSPITAL | Age: 25
End: 2020-11-10

## 2020-11-10 ENCOUNTER — ANCILLARY PROCEDURE (OUTPATIENT)
Dept: NEUROLOGY | Facility: CLINIC | Age: 25
End: 2020-11-10
Attending: PSYCHIATRY & NEUROLOGY
Payer: COMMERCIAL

## 2020-11-10 DIAGNOSIS — R56.9 SEIZURE (H): ICD-10-CM

## 2020-11-10 LAB — LEVETIRACETAM (KEPPRA): 11.3 UG/ML (ref 6–46)

## 2020-11-10 PROCEDURE — 95816 EEG AWAKE AND DROWSY: CPT | Performed by: PSYCHIATRY & NEUROLOGY

## 2021-05-29 NOTE — PROGRESS NOTES
Hospital discharge follow up call to pt through [facility], ID# [id number], no answer.  VM message left reminding pt of their upcoming hospital f/u appt 6/26/19 at 8:40am.  RN will attempt call back at another time.     Eufemia Mackenzie RN Care Manager, Population Health

## 2021-05-30 NOTE — PROGRESS NOTES
Second attempt to reach patient through [facility], ID# [id number], for hospital discharge follow up, no answer, and no voicemail option available.  RN has made two unsuccessful attempts to reach patient.  Encounter closed.    Eufemia Mackenzie RN Care Manager, Population Health

## 2021-05-31 VITALS — BODY MASS INDEX: 19.51 KG/M2 | WEIGHT: 106 LBS | HEIGHT: 62 IN

## 2021-06-08 NOTE — PROGRESS NOTES
": Aranza Ham  Agency: [facility]    Patient declined seeing Dr. Erwin today  His mom did not come with him    Patient confirmed he has been having urinary and bowel accidents  Spends most of his time in bed  Could not explain why this is    Patient was quiet during the appointment  Kept his head down the majority of the time  Most answers were \"I don't know\" when I asked him questions    Patient stated he hasn't used Marijuana or Meth recently; however, couldn't tell me when he last used    During the patient's Goal Setting appointment, he had verbalized wanting his green card  Today, he questioned what a green card is, stating \"what is green card\"    Unclear why the patient is so withdrawn and why he is spending most of his time in bed etc.  Assisted in scheduling an appointment with Dr. Erwin for this Friday, 1/13/17, to address these concerns further  Explained to the patient I will call his mom to inform her of the appointment and request she be present as well  I will schedule transportation     Called patient's mom, Uyen Cobian  : Kori  ID: [id number]  Company: [employer]  Explained patient didn't want to see Dr. Erwin today  Scheduled an appointment for this Friday, 1/13/17, at 1:30 with Dr. Erwin and myself  Patient will also be seeing STUART Cerda, at 2:00  Explained to Uyen Cobian that we need her here for the appointments  She acknowledged understanding    Uyen Cobian stated the entire family applied for green cards at the same time  Everyone received theirs except for the patient    I informed her I will schedule transportation for the appointments on Friday    Pending Appointments:  1/13/17 at 1:30 with Dr. Erwin and Shantelle, Clinic Care Guide                    2:00 with STUART Fraire  5/3/17 at 1:00 with Dr. Erwin    ____________________    Care Guide Delegation from [facility] with Katelynn Negro RN on 9/21/16:  6) Due Date: By 10/19/16  Delegation: Check with Dr Erwin if pt would benefit from a neuropsych exam. An exam " "had been recommended by the therapist completing DLTS Rule 25 assessment on 1/26/16.    Completed Care Guide Delegation from [facility] with Katelynn Negro, RN on 9/21/16:  1) Due Date: Within 1 month from PCA Visit  Delegation: Help the patient to coordinate goal setting visit if not already coordinated.    [facility] Goal Setting appointment scheduled for 10/5/16 at 2:00 with Dr. Erwin and Shantelle, Clinic Care Guide  Patient no showed his appointment on 10/5/16.  I assisted the patient in rescheduling it for 11/2/16 at 2:15.  I have scheduled transportation for the appointment.     2) Due Date: At Goal setting visit  Delegation: Review goals set at [facility] visit and create or add to action plan.    Reviewed goal around obtaining a green card that was set at the [facility].  Changed it into a SMART goal and added action steps.  Delegation completed     3) Due Date: 10/19/16  Delegation:  Pt has been seen by Dr Concepcion, at [facility], for a green card exam on 12/5/2015. Care Guide to check if another exam needs to be done in order to receive a green card and follow up with any other documentation if needed.  We first need to determine what the status of patient's application is.  Patient states he obtained the \"do not open\" envelope and gave it to his , Luis, from [employer].  Patient states he never heard anything since.  Patient signed a FERN for [employer].  We will begin by seeing what we can learn from [employer].  If we cannot gain any information as to the status of the green card application, we will begin from the start.  We have made this into one of the patient's goals and action steps.  Delegation completed     4) Due Date: At Goal setting visit  Delegation: Check with Dr Erwin if pt would benefit from a referral to a chemical dependency treatment center or physician. Signed out AMA on 2/17/16 from inpatient treatment at [facility]. Also had been in program at [facility] in Behavioral Health.  Patient is no longer going " to [facility].  Discussed referral for chemical dependency treatment.  Offered group or individual support options.  Patient would like to think about it.  We have added this to one of his action steps under the goal around staying away from Meth and Marijuana.  Delegation completed    5) Due Date: By 10/19/16  Delegation: Pt had a  at [facility], [facility], who is no longer there. Please check with [facility] if pt has a new  yet and if he's receiving ongoing therapy at [facility].  John Alvarado is the  that is replacing Luis.  John' office phone number is Luis's previous office phone number: [phone].  I have updated the patient's Care Team with John' contact information.  Obtained a FERN for [employer].  Delegation completed  ______________________  Planned Outreach Frequency: monthly  Preferred Phone Number: [phone]    Chronic Medical Diagnosis:  Hepatitis B    Mental Health:  Diagnosis:   Inhalant-induced Psychotic Disorder with Hallucinations    Mental Health Provider:   Mental Health : John Alvarado  Phone: [phone] from [facility]    Transportation:  Medical Transportation  [employer]  ID: [id number]    Housing:  Lives with parents in an apartment    Financial:  No income    Legal Immigration:  [place] Citizen: No  Date Came to [place]: 2012  Green Card: No    Substance/CD:  Cannabis Abuse  Polysubstance Dependence  Methamphetamine use disorder, moderate, dependance    Employment/Education:  Unemployed (would like to work but needs his green card first)  Had about 1 month of schooling in the [place]  Doesn't have the desire to return to school at this time, would like to work    Interpersonal:  Single    Social Support:  Parents    Household Members:  Self  Mom: Uyen Cobian  Dad  3 Siblings (ages 9, 14,17)    Rest/Sleep:  Unknown    Nutrition:  Parents provide food    Exercise:  Unknown    Hygiene:  Independent    Medications:  Prescription Medications:  None    Preventative Measures:  Medical Insurance: [facility]  Annual Physical Exam: 3/12/15  Flu Shot: Declined 11/2/16  Td: 12/2/15  (next due in 10yrs)    Religious/Spiritual:  Unknown    Safety:  Polysubstance use    Family Planning:  N/A    Barriers:  Language: Non-English speaking  Transportation: dependent on medical transportation  Employment: Unemployed    Current Services/Support:  Mental Health : John Alvarado  Phone: [phone] from [place]

## 2021-06-08 NOTE — PROGRESS NOTES
I have called the patient three times over the past few weeks and have been unsuccessful in reaching him for one month.  At this time, the patient has not returned any of my messages.  I will continue attempting to reach out to the patient in one month.  I will also check the patient's chart for upcoming appointments, ER reports that may contain a new phone number, or any other recent activity.  I have informed the patient's primary care provider by message regarding the lack of successful follow up.    Pending Appointments:  5/3/17 at 1:00 with Dr. Erwin    ____________________    Care Guide Delegation from [facility] with Katelynn Negro RN on 9/21/16:  6) Due Date: By 10/19/16  Delegation: Check with Dr Erwin if pt would benefit from a neuropsych exam. An exam had been recommended by the therapist completing DLTS Rule 25 assessment on 1/26/16.    Completed Care Guide Delegation from [facility] with Katelynn Negro RN on 9/21/16:  1) Due Date: Within 1 month from [facility] Visit  Delegation: Help the patient to coordinate goal setting visit if not already coordinated.    [facility] Goal Setting appointment scheduled for 10/5/16 at 2:00 with Dr. Erwin and Shantelle, [facility] Care Guide  Patient no showed his appointment on 10/5/16.  I assisted the patient in rescheduling it for 11/2/16 at 2:15.  I have scheduled transportation for the appointment.     2) Due Date: At Goal setting visit  Delegation: Review goals set at [facility] visit and create or add to action plan.    Reviewed goal around obtaining a green card that was set at the [facility].  Changed it into a SMART goal and added action steps.  Delegation completed     3) Due Date: 10/19/16  Delegation:  Pt has been seen by Dr Concepcion, at [facility], for a green card exam on 12/5/2015. Care Guide to check if another exam needs to be done in order to receive a green card and follow up with any other documentation if needed.  We first need to determine what the status of patient's application is.  Patient states  "he obtained the \"do not open\" envelope and gave it to his , Luis, from [employer].  Patient states he never heard anything since.  Patient signed a FERN for [employer].  We will begin by seeing what we can learn from [employer].  If we cannot gain any information as to the status of the green card application, we will begin from the start.  We have made this into one of the patient's goals and action steps.  Delegation completed     4) Due Date: At Goal setting visit  Delegation: Check with Dr Erwin if pt would benefit from a referral to a chemical dependency treatment center or physician. Signed out AMA on 2/17/16 from inpatient treatment at [facility]. Also had been in program at [facility] in Behavioral Health.  Patient is no longer going to [facility].  Discussed referral for chemical dependency treatment.  Offered group or individual support options.  Patient would like to think about it.  We have added this to one of his action steps under the goal around staying away from Meth and Marijuana.  Delegation completed    5) Due Date: By 10/19/16  Delegation: Pt had a  at [facility], Luis, who is no longer there. Please check with Tino if pt has a new  yet and if he's receiving ongoing therapy at [facility].  John Alvarado is the  that is replacing Luis.  John' office phone number is Luis's previous office phone number: [phone].  I have updated the patient's Care Team with John' contact information.  Obtained a FERN for [employer].  Delegation completed  ______________________  Planned Outreach Frequency: monthly  Preferred Phone Number: [phone]    Chronic Medical Diagnosis:  Hepatitis B    Mental Health:  Diagnosis:   Inhalant-induced Psychotic Disorder with Hallucinations    Mental Health Provider:   Mental Health : John Alvarado  Phone: [phone] from [facility]    Transportation:  Medical Transportation  [employer]  ID: " [id number]    Housing:  Lives with parents in an apartment    Financial:  No income    Legal Immigration:  [place] Citizen: No  Date Came to [place]: 2012  Green Card: No    Substance/CD:  Cannabis Abuse  Polysubstance Dependence  Methamphetamine use disorder, moderate, dependance    Employment/Education:  Unemployed (would like to work but needs his green card first)  Had about 1 month of schooling in the [place]  Doesn't have the desire to return to school at this time, would like to work    Interpersonal:  Single    Social Support:  Parents    Household Members:  Self  Mom: Uyen Cobian  Dad  3 Siblings (ages 9, 14,17)    Rest/Sleep:  Unknown    Nutrition:  Parents provide food    Exercise:  Unknown    Hygiene:  Independent    Medications:  Prescription Medications: None    Preventative Measures:  Medical Insurance: [employer] [place]  Annual Physical Exam: 3/12/15  Flu Shot: Declined 11/2/16  Td: 12/2/15  (next due in 10yrs)    Baptist/Spiritual:  Unknown    Safety:  Polysubstance use    Family Planning:  N/A    Barriers:  Language: Non-English speaking  Transportation: dependent on medical transportation  Employment: Unemployed    Current Services/Support:  Mental Health : John Alvarado  Phone: [phone] from [facility]

## 2021-06-08 NOTE — PROGRESS NOTES
"[facility]  was able to connect with Aditya Lopez (ALAN) with [employer]. STUART asked about the patient's green card status and if John knew anything an application being completed with a previous CM named Luis. John reported that the application has been \"delayed\" due to it being incomplete and that since a lot of time has gone by since it was initially started, the patient will need to start over with the application. John also reported the patient will most likely need a new medical waiver for his green card application as well.    John additionally reported that the patient has been drinking and using drugs and that his parents wanted him to go to a facility to be \"taken care of and get help.\" John asked if [facility] team could assist with this. STUART reported that the patient has not communicated to [facility] team that he wanted to go to a rehab facility/that this was a goal so [facility] team has not been focusing or working on this, CCC has been attempting to assist with his green card and will continue to attempt outreach to schedule an appointment for the patient to come to clinic to begin the application process again. [facility] STUART informed John that he should help the patient with getting into rehab if that has been communicated to him from the patient since the patient will need to be willing to go; [facility] team has not been informed by the patient that he has wanted assistance with this.    John requested that if [facility] team is able to connect with the patient and schedule an appointment to address re-applying for green card, that he be contacted so that he can come with to the appointment. STUART informed John [facility] will update him if CCC able to schedule an appointment for this need. John cell number [phone]    John also asked [facility] STUART what the parents should do if the patient is experiencing a mental health episode. [facility] STUART informed John that if the patient is a harm to himself or others the parents should call " 911 or possibly take him to the ER. CCC SW also asked John if he knew of [facility] crisis team, John said he does know them but that he's concerned about a language barrier with this resource. SW discussed [facility] center/crisis line, informed John the patient could also go to the [facility] walk-in clinic.

## 2021-06-08 NOTE — PROGRESS NOTES
": Saw  ID: [id number]  Company: [employer]  Spoke to: patient's mom    Patient's mom reported the patient was sleeping  If she was to wake him he would yell at her    She expressed concerns about him needing assistance with toileting  Patient will have urinary and bowel accidents in the bed    She stated \"he is getting worse\"    Assisted in scheduling an appointment with Dr. Erwin and myself for this Monday, 1/9/17 at 10:00  Patient's mom will come with to the appointment    She questioned about transportation for the appointment    We were disconnected on two occasions throughout the telephone conversation  I went ahead and scheduled transportation for the appointment  Please see the telephone encounter dated with today's date documenting the transportation details    Pending Appointments:  1/9/17 at 10:00 with Dr. Carranza, Clinic Care Guide  5/3/17 at 1:00 with Dr. Erwin    ____________________    Care Guide Delegation from [facility] with Katelynn Negro, RN on 9/21/16:  6) Due Date: By 10/19/16  Delegation: Check with Dr Erwin if pt would benefit from a neuropsych exam. An exam had been recommended by the therapist completing DLTS Rule 25 assessment on 1/26/16.    Completed Care Guide Delegation from [facility] with Katelynn Negro RN on 9/21/16:  1) Due Date: Within 1 month from [facility] Visit  Delegation: Help the patient to coordinate goal setting visit if not already coordinated.    [facility] Goal Setting appointment scheduled for 10/5/16 at 2:00 with Dr. Erwin and Shantelle, Clinic Care Guide  Patient no showed his appointment on 10/5/16.  I assisted the patient in rescheduling it for 11/2/16 at 2:15.  I have scheduled transportation for the appointment.     2) Due Date: At Goal setting visit  Delegation: Review goals set at PCA visit and create or add to action plan.    Reviewed goal around obtaining a green card that was set at the [facility].  Changed it into a SMART goal and added action steps.  Delegation completed     3) Due Date: " "10/19/16  Delegation:  Pt has been seen by Dr Concepcion, at [facility], for a green card exam on 12/5/2015. Care Guide to check if another exam needs to be done in order to receive a green card and follow up with any other documentation if needed.  We first need to determine what the status of patient's application is.  Patient states he obtained the \"do not open\" envelope and gave it to his , Luis, from [place].  Patient states he never heard anything since.  Patient signed a FERN for [employer].  We will begin by seeing what we can learn from [employer].  If we cannot gain any information as to the status of the green card application, we will begin from the start.  We have made this into one of the patient's goals and action steps.  Delegation completed     4) Due Date: At Goal setting visit  Delegation: Check with Dr Erwin if pt would benefit from a referral to a chemical dependency treatment center or physician. Signed out AMA on 2/17/16 from inpatient treatment at [facility]. Also had been in program at [facility] in Behavioral Health.  Patient is no longer going to [facility].  Discussed referral for chemical dependency treatment.  Offered group or individual support options.  Patient would like to think about it.  We have added this to one of his action steps under the goal around staying away from Meth and Marijuana.  Delegation completed    5) Due Date: By 10/19/16  Delegation: Pt had a  at [employer], Luis, who is no longer there. Please check with Tino if pt has a new  yet and if he's receiving ongoing therapy at [place].  John Alvarado is the  that is replacing Luis.  John' office phone number is Luis's previous office phone number: [phone].  I have updated the patient's Care Team with John' contact information.  Obtained a FERN for [employer].  Delegation completed  ______________________  Planned Outreach Frequency: monthly  Preferred Phone " Number: [phone]    Chronic Medical Diagnosis:  Hepatitis B    Mental Health:  Diagnosis:   Inhalant-induced Psychotic Disorder with Hallucinations    Mental Health Provider:   Mental Health : John Alvarado  Phone: [phone] from [facility]    Transportation:  Medical Transportation  [employer]  ID: [id number]    Housing:  Lives with parents in an apartment    Financial:  No income    Legal Immigration:  [place] Citizen: No  Date Came to [place]: 2012  Green Card: No    Substance/CD:  Cannabis Abuse  Polysubstance Dependence  Methamphetamine use disorder, moderate, dependance    Employment/Education:  Unemployed (would like to work but needs his green card first)  Had about 1 month of schooling in the [place]  Doesn't have the desire to return to school at this time, would like to work    Interpersonal:  Single    Social Support:  Parents    Household Members:  Self  Mom: Uyen Cobian  Dad  3 Siblings (ages 9, 14,17)    Rest/Sleep:  Unknown    Nutrition:  Parents provide food    Exercise:  Unknown    Hygiene:  Independent    Medications:  Prescription Medications: None    Preventative Measures:  Medical Insurance: [employer] [place]  Annual Physical Exam: 3/12/15  Flu Shot: Declined 11/2/16  Td: 12/2/15  (next due in 10yrs)    Zoroastrianism/Spiritual:  Unknown    Safety:  Polysubstance use    Family Planning:  N/A    Barriers:  Language: Non-English speaking  Transportation: dependent on medical transportation  Employment: Unemployed    Current Services/Support:  Mental Health : John Alvarado  Phone: [phone] from [facility]

## 2021-06-08 NOTE — PROGRESS NOTES
Patient arrived for his appointment today  Please see the other Patient Outreach encounter with today's date

## 2021-06-08 NOTE — PROGRESS NOTES
The patient did not show up for the appointment.    [facility]  will:  1) Continue to contact [facility].    [facility] Care Guide Delegation:  Due: At next outreach  Delegation: Ask if the patient would like to re-schedule to meet with .

## 2021-06-09 NOTE — PROGRESS NOTES
I have called the patient several times and have been unsuccessful in reaching her for 2 months now.  At this time, the patient has not returned any of my messages and his number is not accepting incoming calls.  I will continue attempting to reach out to the patient in one month.  I will also check the patient's chart for upcoming appointments, ER reports that may contain a new phone number, or any other recent activity.  I have informed the primary care provider by message regarding the lack of successful follow up.    Pending Appointments:  5/3/17 at 1:00 with Dr. Erwin    ____________________    Care Guide Delegation from [facility] with Katelynn Negro, CLARISSA on 9/21/16:  6) Due Date: By 10/19/16  Delegation: Check with Dr Erwin if pt would benefit from a neuropsych exam. An exam had been recommended by the therapist completing DLTS Rule 25 assessment on 1/26/16.    Completed Care Guide Delegation from [facility] with Katelynn Negro RN on 9/21/16:  1) Due Date: Within 1 month from [facility] Visit  Delegation: Help the patient to coordinate goal setting visit if not already coordinated.    [facility] Goal Setting appointment scheduled for 10/5/16 at 2:00 with Dr. Erwin and Shantelle, [facility] Care Guide  Patient no showed his appointment on 10/5/16.  I assisted the patient in rescheduling it for 11/2/16 at 2:15.  I have scheduled transportation for the appointment.  Delegation Completed     2) Due Date: At Goal setting visit  Delegation: Review goals set at [facility] visit and create or add to action plan.    Reviewed goal around obtaining a green card that was set at the [facility].  Changed it into a SMART goal and added action steps.  Delegation completed     3) Due Date: 10/19/16  Delegation:  Pt has been seen by Dr Concepcion, at [facility], for a green card exam on 12/5/2015. Care Guide to check if another exam needs to be done in order to receive a green card and follow up with any other documentation if needed.  We first need to determine what the status of  "patient's application is.  Patient states he obtained the \"do not open\" envelope and gave it to his , Luis, from [place].  Patient states he never heard anything since.  Patient signed a FERN for [employer].  We will begin by seeing what we can learn from [employer].  If we cannot gain any information as to the status of the green card application, we will begin from the start.  We have made this into one of the patient's goals and action steps.  Delegation completed     4) Due Date: At Goal setting visit  Delegation: Check with Dr Erwin if pt would benefit from a referral to a chemical dependency treatment center or physician. Signed out AMA on 2/17/16 from inpatient treatment at [facility]. Also had been in program at [facility] in Behavioral Health.  Patient is no longer going to [facility].  Discussed referral for chemical dependency treatment.  Offered group or individual support options.  Patient would like to think about it.  We have added this to one of his action steps under the goal around staying away from Meth and Marijuana.  Delegation completed    5) Due Date: By 10/19/16  Delegation: Pt had a  at [place], Luis, who is no longer there. Please check with [facility] if pt has a new  yet and if he's receiving ongoing therapy at [facility].  John Alvarado is the  that is replacing Luis.  John' office phone number is Luis's previous office phone number: [phone].  I have updated the patient's Care Team with John' contact information.  Obtained a FERN for [employer].  Delegation completed  ______________________  Planned Outreach Frequency: monthly  Preferred Phone Number: [phone]    Chronic Medical Diagnosis:  Hepatitis B    Mental Health:  Diagnosis:   Inhalant-induced Psychotic Disorder with Hallucinations    Mental Health Provider:   Mental Health : John Alvarado  Phone: [phone] from [employer]    Transportation:  Medical " Transportation  [employer]  ID: [id number]    Housing:  Lives with parents in an apartment    Financial:  No income    Legal Immigration:  [place] Citizen: No  Date Came to [place]: 2012  Green Card: No    Substance/CD:  Cannabis Abuse  Polysubstance Dependence  Methamphetamine use disorder, moderate, dependance    Employment/Education:  Unemployed (would like to work but needs his green card first)  Had about 1 month of schooling in the [place]  Doesn't have the desire to return to school at this time, would like to work    Interpersonal:  Single    Social Support:  Parents    Household Members:  Self  Mom: Uyen Cobian  Dad  3 Siblings (ages 9, 14,17)    Rest/Sleep:  Unknown    Nutrition:  Parents provide food    Exercise:  Unknown    Hygiene:  Independent    Medications:  Prescription Medications: None    Preventative Measures:  Medical Insurance: [employer] [place]  Annual Physical Exam: 3/12/15  Flu Shot: Declined 11/2/16  Td: 12/2/15  (next due in 10yrs)    Methodist/Spiritual:  Unknown    Safety:  Polysubstance use    Family Planning:  N/A    Barriers:  Language: Non-English speaking  Transportation: dependent on medical transportation  Employment: Unemployed    Current Services/Support:  Mental Health : John Alvarado  Phone: [phone] from [facility]

## 2021-06-10 NOTE — PROGRESS NOTES
I have called the patient several times over the past three months and have been unsuccessful in reaching him.  The patient has not returned any of my messages and now his phone states it is not taking incoming calls.  I am mailing the patient a letter stating the lack of success in reaching him.  I will continue attempting to reach out to the patient in 3 months.  I will also check the patient's chart for upcoming appointments, ER reports that may contain a new phone number, or any other recent activity.  I have informed the primary care provider by message regarding the lack of successful follow up.    The patient does have a pending appointment with Dr. Erwin on 5/3/17 at 1:00.    I had placed the patient on my schedule as well in order to follow up on goals  Patient benjamin showed the appointment today, 5/3/17.    Pending Appointments:  5/3/17 at 1:00 with Dr. Erwin    ____________________    Care Guide Delegation from [facility] with Katelynn Negro RN on 9/21/16:  6) Due Date: By 10/19/16  Delegation: Check with Dr Erwin if pt would benefit from a neuropsych exam. An exam had been recommended by the therapist completing DLTS Rule 25 assessment on 1/26/16.    Completed Care Guide Delegation from [facility] with Katelynn Negro RN on 9/21/16:  1) Due Date: Within 1 month from [facility] Visit  Delegation: Help the patient to coordinate goal setting visit if not already coordinated.    [facility] Goal Setting appointment scheduled for 10/5/16 at 2:00 with Dr. Erwin and Shantelle, [facility] Care Guide  Patient benjamin showed his appointment on 10/5/16.  I assisted the patient in rescheduling it for 11/2/16 at 2:15.  I have scheduled transportation for the appointment.  Delegation Completed     2) Due Date: At Goal setting visit  Delegation: Review goals set at [facility] visit and create or add to action plan.    Reviewed goal around obtaining a green card that was set at the [facility].  Changed it into a SMART goal and added action steps.  Delegation completed     3) Due Date:  "10/19/16  Delegation:  Pt has been seen by Dr Concepcion, at [facility], for a green card exam on 12/5/2015. Care Guide to check if another exam needs to be done in order to receive a green card and follow up with any other documentation if needed.  We first need to determine what the status of patient's application is.  Patient states he obtained the \"do not open\" envelope and gave it to his , Luis, from [place].  Patient states he never heard anything since.  Patient signed a FERN for [employer].  We will begin by seeing what we can learn from [employer].  If we cannot gain any information as to the status of the green card application, we will begin from the start.  We have made this into one of the patient's goals and action steps.  Delegation completed     4) Due Date: At Goal setting visit  Delegation: Check with Dr Erwin if pt would benefit from a referral to a chemical dependency treatment center or physician. Signed out AMA on 2/17/16 from inpatient treatment at [facility]. Also had been in program at [facility] in Behavioral Health.  Patient is no longer going to [facility].  Discussed referral for chemical dependency treatment.  Offered group or individual support options.  Patient would like to think about it.  We have added this to one of his action steps under the goal around staying away from Meth and Marijuana.  Delegation completed    5) Due Date: By 10/19/16  Delegation: Pt had a  at [place], Luis, who is no longer there. Please check with Tino if pt has a new  yet and if he's receiving ongoing therapy at [place].  John Alvarado is the  that is replacing Luis.  John' office phone number is Luis's previous office phone number: [phone].  I have updated the patient's Care Team with John' contact information.  Obtained a FERN for [employer].  Delegation completed  ______________________  Planned Outreach Frequency: monthly  Preferred Phone " Number: [phone]    Chronic Medical Diagnosis:  Hepatitis B    Mental Health:  Diagnosis:   Inhalant-induced Psychotic Disorder with Hallucinations    Mental Health Provider:   Mental Health : John Alvarado  Phone: [phone] from "[employer]    Transportation:  Medical Transportation  [employer]  ID: [id number]    Housing:  Lives with parents in an apartment    Financial:  No income    Legal Immigration:  [place] Citizen: No  Date Came to [place]: 2012  Green Card: No    Substance/CD:  Cannabis Abuse  Polysubstance Dependence  Methamphetamine use disorder, moderate, dependance    Employment/Education:  Unemployed (would like to work but needs his green card first)  Had about 1 month of schooling in the [place]  Doesn't have the desire to return to school at this time, would like to work    Interpersonal:  Single    Social Support:  Parents    Household Members:  Self  Mom: Uyen Cobian  Dad  3 Siblings (ages 9, 14,17)    Rest/Sleep:  Unknown    Nutrition:  Parents provide food    Exercise:  Unknown    Hygiene:  Independent    Medications:  Prescription Medications: None    Preventative Measures:  Medical Insurance: [employer] [place]  Annual Physical Exam: 3/12/15  Flu Shot: Declined 11/2/16  Td: 12/2/15  (next due in 10yrs)    Restoration/Spiritual:  Unknown    Safety:  Polysubstance use    Family Planning:  N/A    Barriers:  Language: Non-English speaking  Transportation: dependent on medical transportation  Employment: Unemployed    Current Services/Support:  Mental Health : John Alvarado  Phone: [phone] from "[employer]

## 2021-06-10 NOTE — PROGRESS NOTES
I have called the patient several times over the past three months and have been unsuccessful in reaching him.  The patient has not returned any of my messages and now his phone states it is not taking incoming calls.  I am mailing the patient a letter stating the lack of success in reaching him.  I will continue attempting to reach out to the patient in 3 months.  I will also check the patient's chart for upcoming appointments, ER reports that may contain a new phone number, or any other recent activity.  I have informed the primary care provider by message regarding the lack of successful follow up.  The patient does have a pending appointment with Dr. Erwin on 5/3/17 at 1:00; however, I am not available to meet with the patient that day.    Pending Appointments:  5/3/17 at 1:00 with Dr. Erwin    ____________________    Care Guide Delegation from [facility] with Katelynn Negro RN on 9/21/16:  6) Due Date: By 10/19/16  Delegation: Check with Dr Erwin if pt would benefit from a neuropsych exam. An exam had been recommended by the therapist completing DLTS Rule 25 assessment on 1/26/16.    Completed Care Guide Delegation from [facility] with Katelynn Negro RN on 9/21/16:  1) Due Date: Within 1 month from [facility] Visit  Delegation: Help the patient to coordinate goal setting visit if not already coordinated.    [facility] Goal Setting appointment scheduled for 10/5/16 at 2:00 with Dr. Erwin and Shantelle, [facility] Care Guide  Patient no showed his appointment on 10/5/16.  I assisted the patient in rescheduling it for 11/2/16 at 2:15.  I have scheduled transportation for the appointment.  Delegation Completed     2) Due Date: At Goal setting visit  Delegation: Review goals set at [facility] visit and create or add to action plan.    Reviewed goal around obtaining a green card that was set at the [facility].  Changed it into a SMART goal and added action steps.  Delegation completed     3) Due Date: 10/19/16  Delegation:  Pt has been seen by Dr Concepcion, at [facility],  "for a green card exam on 12/5/2015. Care Guide to check if another exam needs to be done in order to receive a green card and follow up with any other documentation if needed.  We first need to determine what the status of patient's application is.  Patient states he obtained the \"do not open\" envelope and gave it to his , Luis, from [place].  Patient states he never heard anything since.  Patient signed a FERN for [employer].  We will begin by seeing what we can learn from [place].  If we cannot gain any information as to the status of the green card application, we will begin from the start.  We have made this into one of the patient's goals and action steps.  Delegation completed     4) Due Date: At Goal setting visit  Delegation: Check with Dr Erwin if pt would benefit from a referral to a chemical dependency treatment center or physician. Signed out AMA on 2/17/16 from inpatient treatment at [facility]. Also had been in program at [facility] in Behavioral Health.  Patient is no longer going to [facility].  Discussed referral for chemical dependency treatment.  Offered group or individual support options.  Patient would like to think about it.  We have added this to one of his action steps under the goal around staying away from Meth and Marijuana.  Delegation completed    5) Due Date: By 10/19/16  Delegation: Pt had a  at [facility], Luis, who is no longer there. Please check with [facility] if pt has a new  yet and if he's receiving ongoing therapy at [facility].  John Alvarado is the  that is replacing Luis.  John' office phone number is Luis's previous office phone number: [phone].  I have updated the patient's Care Team with John' contact information.  Obtained a FERN for [employer].  Delegation completed  ______________________  Planned Outreach Frequency: monthly  Preferred Phone Number: [phone]    Chronic Medical Diagnosis:  Hepatitis B    Mental " Health:  Diagnosis:   Inhalant-induced Psychotic Disorder with Hallucinations    Mental Health Provider:   Mental Health : John Alvarado  Phone: [phone] from [employer]    Transportation:  Medical Transportation  [employer]  ID: [id number]    Housing:  Lives with parents in an apartment    Financial:  No income    Legal Immigration:  [place] Citizen: No  Date Came to [place]: 2012  Green Card: No    Substance/CD:  Cannabis Abuse  Polysubstance Dependence  Methamphetamine use disorder, moderate, dependance    Employment/Education:  Unemployed (would like to work but needs his green card first)  Had about 1 month of schooling in the [place]  Doesn't have the desire to return to school at this time, would like to work    Interpersonal:  Single    Social Support:  Parents    Household Members:  Self  Mom: Uyen Cobian  Dad  3 Siblings (ages 9, 14,17)    Rest/Sleep:  Unknown    Nutrition:  Parents provide food    Exercise:  Unknown    Hygiene:  Independent    Medications:  Prescription Medications: None    Preventative Measures:  Medical Insurance: [employer] [place]  Annual Physical Exam: 3/12/15  Flu Shot: Declined 11/2/16  Td: 12/2/15  (next due in 10yrs)    Rastafarian/Spiritual:  Unknown    Safety:  Polysubstance use    Family Planning:  N/A    Barriers:  Language: Non-English speaking  Transportation: dependent on medical transportation  Employment: Unemployed    Current Services/Support:  Mental Health : John Alvarado  Phone: [phone] from [employer]

## 2021-06-11 NOTE — PROGRESS NOTES
"Second Monthly Follow Up Call:  Care Guide attempted to call the patient; however, the automated message on his phone states \"at the subscribers request, this number does not accept in-coming calls.\"    If the patient is returning my call, please transfer him to me, Shantelle Monreal, at [phone].    I have called the patient and have been unsuccessful in reaching him since 1/13/17.  The patient has not returned any of my messages.  I have sent the patient a letter and un-enrolled him from [facility].  The patient can be referred again if there is a need for care coordination in the future.    Pending Appointments:  None    ____________________    Care Guide Delegation from [facility] with Katelynn Negro RN on 9/21/16:  6) Due Date: By 10/19/16  Delegation: Check with Dr Erwin if pt would benefit from a neuropsych exam. An exam had been recommended by the therapist completing DLTS Rule 25 assessment on 1/26/16.    Completed Care Guide Delegation from [facility] with Katelynn Negro RN on 9/21/16:  1) Due Date: Within 1 month from [facility] Visit  Delegation: Help the patient to coordinate goal setting visit if not already coordinated.    [facility] Goal Setting appointment scheduled for 10/5/16 at 2:00 with Dr. Erwin and Shantelle, [facility] Care Guide  Patient no showed his appointment on 10/5/16.  I assisted the patient in rescheduling it for 11/2/16 at 2:15.  I have scheduled transportation for the appointment.  Delegation Completed     2) Due Date: At Goal setting visit  Delegation: Review goals set at [facility] visit and create or add to action plan.    Reviewed goal around obtaining a green card that was set at the [facility].  Changed it into a SMART goal and added action steps.  Delegation completed     3) Due Date: 10/19/16  Delegation:  Pt has been seen by Dr Concepcion, at [facility], for a green card exam on 12/5/2015. Care Guide to check if another exam needs to be done in order to receive a green card and follow up with any other documentation " "if needed.  We first need to determine what the status of patient's application is.  Patient states he obtained the \"do not open\" envelope and gave it to his , Luis, from [place].  Patient states he never heard anything since.  Patient signed a FERN for [employer].  We will begin by seeing what we can learn from [employer].  If we cannot gain any information as to the status of the green card application, we will begin from the start.  We have made this into one of the patient's goals and action steps.  Delegation completed     4) Due Date: At Goal setting visit  Delegation: Check with Dr Erwin if pt would benefit from a referral to a chemical dependency treatment center or physician. Signed out AMA on 2/17/16 from inpatient treatment at [facility]. Also had been in program at [facility] in Behavioral Health.  Patient is no longer going to [facility].  Discussed referral for chemical dependency treatment.  Offered group or individual support options.  Patient would like to think about it.  We have added this to one of his action steps under the goal around staying away from Meth and Marijuana.  Delegation completed    5) Due Date: By 10/19/16  Delegation: Pt had a  at [place], Luis, who is no longer there. Please check with [facility] if pt has a new  yet and if he's receiving ongoing therapy at [place].  John Alvarado is the  that is replacing Luis.  John' office phone number is Luis's previous office phone number: [phone].  I have updated the patient's Care Team with John' contact information.  Obtained a FERN for [facility].  Delegation completed  ______________________  Planned Outreach Frequency: monthly  Preferred Phone Number: [phone]    Chronic Medical Diagnosis:  Hepatitis B    Mental Health:  Diagnosis:   Inhalant-induced Psychotic Disorder with Hallucinations    Mental Health Provider:   Mental Health : John Alvarado  Phone: " [phone] from [place]    Transportation:  Medical Transportation  [employer]  ID: [id number]    Housing:  Lives with parents in an apartment    Financial:  No income    Legal Immigration:  [place] Citizen: No  Date Came to [place]: 2012  Green Card: No    Substance/CD:  Cannabis Abuse  Polysubstance Dependence  Methamphetamine use disorder, moderate, dependance    Employment/Education:  Unemployed (would like to work but needs his green card first)  Had about 1 month of schooling in the [place]  Doesn't have the desire to return to school at this time, would like to work    Interpersonal:  Single    Social Support:  Parents    Household Members:  Self  Mom: Uyen Cobian  Dad  3 Siblings (ages 9, 14,17)    Rest/Sleep:  Unknown    Nutrition:  Parents provide food    Exercise:  Unknown    Hygiene:  Independent    Medications:  Prescription Medications: None    Preventative Measures:  Medical Insurance: [employer] [place]  Annual Physical Exam: 3/12/15  Flu Shot: Declined 11/2/16  Td: 12/2/15  (next due in 10yrs)    Anglican/Spiritual:  Unknown    Safety:  Polysubstance use    Family Planning:  N/A    Barriers:  Language: Non-English speaking  Transportation: dependent on medical transportation  Employment: Unemployed    Current Services/Support:  Mental Health : John Alvarado  Phone: [phone] from [employer]
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
## 2021-06-19 NOTE — LETTER
Letter by Ki Orozco MBBS at      Author: Ki Orozco MBBS Service: -- Author Type: --    Filed:  Encounter Date: 6/19/2019 Status: (Other)         June 19, 2019     Patient: Moose Sanchez   YOB: 1995   Date of Visit: 6/19/2019       To Whom It May Concern:    It is to inform that Moose Sanchez was admitted at [facility] from 6/16/19 to 6/19/19. He is not supposed to drive until clear by his primary care physician or neurologist. He has no medical contraindication to resume his school as early as tomorrow.      If you have any questions or concerns, please don't hesitate to call.    Sincerely,        Electronically signed by PATRICIO Bernal

## 2021-07-14 PROBLEM — J96.00 ARF (ACUTE RESPIRATORY FAILURE) (H): Status: RESOLVED | Noted: 2019-06-17 | Resolved: 2019-06-18

## 2021-10-28 DIAGNOSIS — G40.009 PARTIAL IDIOPATHIC EPILEPSY WITH SEIZURES OF LOCALIZED ONSET, NOT INTRACTABLE, WITHOUT STATUS EPILEPTICUS (H): ICD-10-CM

## 2021-10-28 NOTE — LETTER
10/28/2021        RE: Moose Sanchez  [address]  [place] [address]                Dear Moose,        We recently provided you with medication refills.  Many medications require routine follow-up with your doctor.    Your prescription(s) have been refilled for 30 days so you may have time for the above noted follow-up. Please call to schedule soon so we can assure you have an appointment before your next refills are needed. If you have already made a follow up appointment, please disregard this letter.           Sincerely,        [facility]     (Formerly known as [facility])  Dr. Ennis Care Team

## 2021-10-29 RX ORDER — LEVETIRACETAM 1000 MG/1
1000 TABLET ORAL 2 TIMES DAILY
Qty: 60 TABLET | Refills: 0 | Status: SHIPPED | OUTPATIENT
Start: 2021-10-29 | End: 2021-12-28

## 2021-10-29 NOTE — TELEPHONE ENCOUNTER
Refill request for levetiracetam.  Due for appt with Dr. Ennis.  Letter mailed mailed to pt to remind to schedule.  Medication T'd for review and signature    Araceli Garcia LPN on 10/29/2021 at 9:56 AM

## 2021-12-27 DIAGNOSIS — G40.009 PARTIAL IDIOPATHIC EPILEPSY WITH SEIZURES OF LOCALIZED ONSET, NOT INTRACTABLE, WITHOUT STATUS EPILEPTICUS (H): ICD-10-CM

## 2021-12-28 RX ORDER — LEVETIRACETAM 1000 MG/1
1000 TABLET ORAL 2 TIMES DAILY
Qty: 60 TABLET | Refills: 0 | Status: SHIPPED | OUTPATIENT
Start: 2021-12-28 | End: 2022-01-27

## 2021-12-28 NOTE — TELEPHONE ENCOUNTER
Medication refill request for levETIRAcetam (KEPPRA) 1000 MG tablet. Last refill was on October 29, 2021.    Patient was last seen on November 10, 2020. Pt is due to come in for a follow up appt. Letter was sent to the pt on 10/29/2021 as a reminder to call clinic for appt but looks like pt has not made one yet.     Medication T'd for review and signature      BERLIN Pacheco on 12/28/2021 at 11:45 AM

## 2022-01-27 DIAGNOSIS — G40.009 PARTIAL IDIOPATHIC EPILEPSY WITH SEIZURES OF LOCALIZED ONSET, NOT INTRACTABLE, WITHOUT STATUS EPILEPTICUS (H): ICD-10-CM

## 2022-01-27 RX ORDER — LEVETIRACETAM 1000 MG/1
TABLET ORAL
Qty: 60 TABLET | Refills: 0 | Status: SHIPPED | OUTPATIENT
Start: 2022-01-27 | End: 2022-02-25

## 2022-01-27 NOTE — LETTER
1/27/2022        RE: Moose Sanchez  [address]  [place] [address]            Dear Moose,         We recently provided you with medication refills.  Many medications require routine follow-up with your doctor.    Your prescription(s) have been refilled for 30 days so you may have time for the above noted follow-up. Please call to schedule soon so we can assure you have an appointment before your next refills are needed. If you have already made a follow up appointment, please disregard this letter.         Sincerely,      [facility] [place]     (Formerly known as [facility])

## 2022-02-24 DIAGNOSIS — G40.009 PARTIAL IDIOPATHIC EPILEPSY WITH SEIZURES OF LOCALIZED ONSET, NOT INTRACTABLE, WITHOUT STATUS EPILEPTICUS (H): ICD-10-CM

## 2022-02-25 RX ORDER — LEVETIRACETAM 1000 MG/1
TABLET ORAL
Qty: 28 TABLET | Refills: 0 | Status: SHIPPED | OUTPATIENT
Start: 2022-02-25 | End: 2022-03-10

## 2022-02-25 NOTE — TELEPHONE ENCOUNTER
Refill request for Keppra. Pt last seen 11/10/20. Pt has been mailed letters regarding this need. Will send 14 day supply with zero refills.     Nahomi Galan RN on 2/25/2022 at 8:27 AM

## 2022-03-10 DIAGNOSIS — G40.009 PARTIAL IDIOPATHIC EPILEPSY WITH SEIZURES OF LOCALIZED ONSET, NOT INTRACTABLE, WITHOUT STATUS EPILEPTICUS (H): ICD-10-CM

## 2022-03-10 RX ORDER — LEVETIRACETAM 1000 MG/1
TABLET ORAL
Qty: 14 TABLET | Refills: 0 | Status: SHIPPED | OUTPATIENT
Start: 2022-03-10 | End: 2022-03-18

## 2022-03-10 NOTE — TELEPHONE ENCOUNTER
Refill request for Keppra. Pt last seen 11/10/20. Pt has been mailed letters regarding need for appt. Will send in 7 day supply with zero refills.     Nahomi Galan RN on 3/10/2022 at 4:04 PM

## 2022-03-18 ENCOUNTER — TELEPHONE (OUTPATIENT)
Dept: NEUROLOGY | Facility: CLINIC | Age: 27
End: 2022-03-18
Payer: COMMERCIAL

## 2022-03-18 DIAGNOSIS — G40.009 PARTIAL IDIOPATHIC EPILEPSY WITH SEIZURES OF LOCALIZED ONSET, NOT INTRACTABLE, WITHOUT STATUS EPILEPTICUS (H): ICD-10-CM

## 2022-03-18 RX ORDER — LEVETIRACETAM 1000 MG/1
TABLET ORAL
Qty: 60 TABLET | Refills: 2 | Status: SHIPPED | OUTPATIENT
Start: 2022-03-18 | End: 2022-03-21

## 2022-03-18 NOTE — TELEPHONE ENCOUNTER
Refill request for Keppra. Pt last seen 11/10/20 and has follow up scheduled for 5/9/22. Will send in refills.     Nahomi Galan RN on 3/18/2022 at 3:04 PM

## 2022-03-21 DIAGNOSIS — G40.009 PARTIAL IDIOPATHIC EPILEPSY WITH SEIZURES OF LOCALIZED ONSET, NOT INTRACTABLE, WITHOUT STATUS EPILEPTICUS (H): ICD-10-CM

## 2022-03-21 RX ORDER — LEVETIRACETAM 1000 MG/1
TABLET ORAL
Qty: 60 TABLET | Refills: 2 | Status: SHIPPED | OUTPATIENT
Start: 2022-03-21 | End: 2022-05-09

## 2022-03-21 NOTE — TELEPHONE ENCOUNTER
[facility] Call Center    Phone Message    May a detailed message be left on voicemail: yes     Reason for Call: Medication Refill Request    Has the patient contacted the pharmacy for the refill? Yes   Name of medication being requested: levETIRAcetam (KEPPRA) 1000 MG tablet  Provider who prescribed the medication: Dr. Ennis  Pharmacy: [facility]  [address] [place] [id number]  [phone]  Date medication is needed: ASAP   Per Fausto Pizarroer, Pt is very close to being out of medicaton. It appears Rx was sent to wrong pharmacy per last request.      Action Taken: Message routed to:  Other: [facility]    Travel Screening: Not Applicable

## 2022-05-09 ENCOUNTER — OFFICE VISIT (OUTPATIENT)
Dept: NEUROLOGY | Facility: CLINIC | Age: 27
End: 2022-05-09
Payer: COMMERCIAL

## 2022-05-09 VITALS
BODY MASS INDEX: 26.12 KG/M2 | HEART RATE: 101 BPM | SYSTOLIC BLOOD PRESSURE: 136 MMHG | HEIGHT: 64 IN | DIASTOLIC BLOOD PRESSURE: 80 MMHG | WEIGHT: 153 LBS

## 2022-05-09 DIAGNOSIS — G40.009 PARTIAL IDIOPATHIC EPILEPSY WITH SEIZURES OF LOCALIZED ONSET, NOT INTRACTABLE, WITHOUT STATUS EPILEPTICUS (H): Primary | ICD-10-CM

## 2022-05-09 PROCEDURE — 99214 OFFICE O/P EST MOD 30 MIN: CPT | Performed by: PSYCHIATRY & NEUROLOGY

## 2022-05-09 RX ORDER — LEVETIRACETAM 1000 MG/1
TABLET ORAL
Qty: 60 TABLET | Refills: 11 | Status: SHIPPED | OUTPATIENT
Start: 2022-05-09 | End: 2023-04-14

## 2022-05-09 RX ORDER — ARIPIPRAZOLE 400 MG
KIT INTRAMUSCULAR
COMMUNITY
Start: 2022-04-14

## 2022-05-09 RX ORDER — QUETIAPINE FUMARATE 200 MG/1
200 TABLET, FILM COATED ORAL
COMMUNITY

## 2022-05-09 NOTE — LETTER
"    5/9/2022         RE: Moose Sanchez  [address] [place] [address]        Dear Colleague,    Thank you for referring your patient, Moose Sanchez, to the [facility] [place]. Please see a copy of my visit note below.    In person evaluation  With  to help with visit    HPI  9/11/2020, in person visit  11/10/2020, in person visit  5/9/2022, in person visit      26-year-old being evaluated neurologically for:  Epilepsy  History of substance abuse  History of psychiatric disease with psychosis    Since last seen 18 months ago  No hospitalizations  No surgeries  No major illnesses  No seizures    Does not work  Watches TV during the day  No longer doing drugs or alcohol  Does still use the \"betel nut\"      A.  Epilepsy       Patient who originally  seen in June 2019  in hospital       Had 4 seizures during that hospitalization thought to be triggered by polysubstance abuse       Was supposedly discharged on Keppra 1000 mg twice daily       Never came back for follow-up         further breakthrough seizure August 8, 2020       Per family when shaking       patient amnestic for the event       August 14, 2020, Keppra level 8.9      B.  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 r  Drug use amphetamines heroin in the past  Note states that he has used cannabis in the past with abuse  Has had a history of inhalant induced psychotic disorder with hallucinations  No history of alcohol withdrawal seizures per chart  Uses betel nut (per reports can have stimulant effect, habit-forming, carcinogenic, can cause psychosis tachycardia and elevated blood pressure)           Work-up June 2019  CT scan head June 2018 normal  MRI scan brain June 2018 with and without contrast normal  EEG June 2019 intubated sedated 3-4 Hz and 5-6 Hz diffuse slowing no epileptiform activity  CSF June 2019, WBC 3/2, RBC 0/0, protein 21, glucose 84, PCR negative for HSV, varicella, enterovirus  Recent work-up August 2020  Keppra level 8.98 August 14, 2020  TSH 1.03  Labs August 8, 2020  Sodium 137 potassium 3.9 BUN 6 creatinine 1.38  Glucose 147  AST 26  White blood count 8.3, hemoglobin 13.9, platelets 346,000    EEG November 10, 2020 10 Hz posterior dominant rhythm normal awake and drowsy record    Laboratory review                    6/17/2019  8/14/2020   11/10/2020  Keppra       13.8              8.9              11.3      Exam     Review of systems  No headache no chest pain no shortness of breath no nausea vomiting no cough no sore throat no diarrhea no fever chills no abdominal pain    No seizures    No diplopia dysarthria dysphagia  No focal weakness numbness or tingling or ataxia      Otherwise review of systems negative        Exam  Blood pressure 136/80, pulse 101, temperature 98.0  Pulse 55  Temp 98  HEENT normal  Lungs clear  Heart rate regular  Abdomen  soft positive bowel sound  Symmetrical pulses   No edema in the feet      Neurologic exam  Alert oriented x3  Normal prosody of speech  Normal naming  Normal comprehension  Normal repetition  No aphasia  Patient was seen with the   No neglect  Memory recall okay    Cranials 2 through 12 normal  No ophthalmoplegia  No nystagmus  Pupils round reactive to light symmetrical  Visual fields intact  Tongue twisters are good  Face symmetrical    Upper extremities  No drift no tremor normal finger-nose    Lower extremities distal proximal strength good    Gait  Gets up from the chair with his arms crossed ambulates without any difficulty with ataxia or incoordination  Romberg negative    Neuro exam normal            Impression    1.  First-time seizure June 2019 presented with 4 seizures respiratory difficulty thought to be triggered by may be methamphetamine abuse at the time.  Second seizure August 4, 2020    2.  Started on Keppra 1000 mg twice daily 8 June 2019    3.  Supposedly had a shaking spell amnestic for the event August 8, 2020 breakthrough seizure,        Keppra level not done until a week later though and was 8.9    Recommend/plan    Epilepsy  Keppra 1000 mg p.o. twice daily    Recommend recheck level to make sure he still taking medication  States that he does not miss doses  Has been seizure-free      Follow-up at least on a yearly basis  Medication refilled    32 minutes total care time today                Again, thank you for allowing me to participate in the care of your patient.        Sincerely,        Howard Ennis MD

## 2022-05-09 NOTE — PROGRESS NOTES
"In person evaluation  With  to help with visit    HPI  9/11/2020, in person visit  11/10/2020, in person visit  5/9/2022, in person visit      26-year-old being evaluated neurologically for:  Epilepsy  History of substance abuse  History of psychiatric disease with psychosis    Since last seen 18 months ago  No hospitalizations  No surgeries  No major illnesses  No seizures    Does not work  Watches TV during the day  No longer doing drugs or alcohol  Does still use the \"betel nut\"      A.  Epilepsy       Patient who originally  seen in June 2019  in hospital       Had 4 seizures during that hospitalization thought to be triggered by polysubstance abuse       Was supposedly discharged on Keppra 1000 mg twice daily       Never came back for follow-up         further breakthrough seizure August 8, 2020       Per family when shaking       patient amnestic for the event       August 14, 2020, Keppra level 8.9      B.  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  History of psychosis            Past medical history  Psych history with psychosis  Polysubstance abuse (alcohol, methamphetamines, marijuana)  History of hepatitis B  First-time seizure times 4 June 2019 hospitalized at 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[place] 3 days/week r  Drug use amphetamines heroin in the past  Note states that he has used cannabis in the past with abuse  Has had a history of inhalant induced psychotic disorder with hallucinations  No history of alcohol withdrawal seizures per chart  Uses betel nut (per reports can have stimulant effect, habit-forming, carcinogenic, can cause psychosis tachycardia and elevated blood pressure)           Work-up June 2019  CT scan head June 2018 normal  MRI scan brain June 2018 with and without contrast normal  EEG June 2019 intubated sedated 3-4 Hz and 5-6 Hz diffuse slowing no epileptiform activity  CSF June 2019, WBC 3/2, RBC 0/0, protein 21, glucose 84, PCR negative for HSV, varicella, enterovirus  Recent work-up August 2020  Keppra level 8.98 August 14, 2020  TSH 1.03  Labs August 8, 2020  Sodium 137 potassium 3.9 BUN 6 creatinine 1.38  Glucose 147  AST 26  White blood count 8.3, hemoglobin 13.9, platelets 346,000    EEG November 10, 2020 10 Hz posterior dominant rhythm normal awake and drowsy record    Laboratory review                    6/17/2019  8/14/2020   11/10/2020  Keppra       13.8              8.9              11.3      Exam     Review of systems  No headache no chest pain no shortness of breath no nausea vomiting no cough no sore throat no diarrhea no fever chills no abdominal pain    No seizures    No diplopia dysarthria dysphagia  No focal weakness numbness or tingling or ataxia      Otherwise review of systems negative        Exam  Blood pressure 136/80, pulse 101, temperature 98.0  Pulse 55  Temp 98  HEENT normal  Lungs clear  Heart rate regular  Abdomen soft positive bowel sound  Symmetrical pulses   No edema in the feet      Neurologic exam  Alert oriented x3  Normal prosody of speech  Normal naming  Normal comprehension  Normal repetition  No aphasia  Patient was seen with the   No  neglect  Memory recall okay    Cranials 2 through 12 normal  No ophthalmoplegia  No nystagmus  Pupils round reactive to light symmetrical  Visual fields intact  Tongue twisters are good  Face symmetrical    Upper extremities  No drift no tremor normal finger-nose    Lower extremities distal proximal strength good    Gait  Gets up from the chair with his arms crossed ambulates without any difficulty with ataxia or incoordination  Romberg negative    Neuro exam normal            Impression    1.  First-time seizure June 2019 presented with 4 seizures respiratory difficulty thought to be triggered by may be methamphetamine abuse at the time.  Second seizure August 4, 2020    2.  Started on Keppra 1000 mg twice daily 8 June 2019    3.  Supposedly had a shaking spell amnestic for the event August 8, 2020 breakthrough seizure,        Keppra level not done until a week later though and was 8.9    Recommend/plan    Epilepsy  Keppra 1000 mg p.o. twice daily    Recommend recheck level to make sure he still taking medication  States that he does not miss doses  Has been seizure-free      Follow-up at least on a yearly basis  Medication refilled    32 minutes total care time today

## 2022-05-09 NOTE — NURSING NOTE
Chief Complaint   Patient presents with     Seizures     Pt states he has not had any seizures     Araceli Garcia LPN on 5/9/2022 at 12:59 PM

## 2023-02-17 NOTE — TELEPHONE ENCOUNTER
Refill request for Keppra. Pt last seen 11/10/20 and was due to be seen around 5/2021. Pt does not have follow up scheduled yet. Will mail letter. Will also send 30 day supply with zero refills.     Nahomi Galan RN on 1/27/2022 at 4:21 PM    
No

## 2023-08-11 DIAGNOSIS — G40.009 PARTIAL IDIOPATHIC EPILEPSY WITH SEIZURES OF LOCALIZED ONSET, NOT INTRACTABLE, WITHOUT STATUS EPILEPTICUS (H): ICD-10-CM

## 2023-08-11 RX ORDER — LEVETIRACETAM 1000 MG/1
1000 TABLET ORAL 2 TIMES DAILY
Qty: 60 TABLET | Refills: 0 | Status: SHIPPED | OUTPATIENT
Start: 2023-08-11 | End: 2023-08-30

## 2023-08-11 NOTE — TELEPHONE ENCOUNTER
Refill request for: levetiracetam 1000mg   Directions: Take 1 tablet (1,000 mg) by mouth 2 times daily     LOV: 05/09/22  NOV: 08/30/23    30 day supply with 0 refills Medication T'd for review and signature    Araceli Garcia LPN on 8/11/2023 at 3:52 PM

## 2023-08-29 NOTE — PROGRESS NOTES
"In person evaluation  With  to help with visit    HPI  9/11/2020, in person visit  11/10/2020, in person visit  5/9/2022, in person visit  8/30/2023, in person visit      27-year-old being evaluated neurologically for:  Epilepsy  History of substance abuse  History of psychiatric disease with psychosis      Since last seen about 16 months ago  No hospitalizations  No surgeries  No major illnesses  No seizures    Does not work  Watches TV during the day  No longer doing drugs or alcohol  Does still use the \"betel nut\"    Lives with parents  Eating okay  Remembers his medication  Has not missed doses        A.  Epilepsy       Patient who originally  seen in June 2019  in hospital       Had 4 seizures during that hospitalization thought to be triggered by polysubstance abuse       Was supposedly discharged on Keppra 1000 mg twice daily       Never came back for follow-up         further breakthrough seizure August 8, 2020       Per family when shaking       patient amnestic for the event       August 14, 2020, Keppra level 8.9      B.  History of polysubstance abuse       Alcohol/methamphetamine/marijuana in the past       Past history of alcohol use 2 to 4/week or month unknown current consumption       In the past, CD treatment at [place] 3 days/week         Drug use amphetamines heroin in the past        Note states that he has used cannabis in the past with abuse       Has had a history of inhalant induced psychotic disorder with hallucinations       No history of alcohol withdrawal seizures per chart       Uses betel nut (per reports can have stimulant effect, habit-forming, carcinogenic, can cause psychosis tachycardia and elevated blood pressure)       C.  History of psychosis            Past medical history  Psych history with psychosis  Polysubstance abuse (alcohol, methamphetamines, marijuana)  History of hepatitis B  First-time seizure times 4 June 2019 hospitalized at [facility]      Habits  Alcohol 4 beers per week, 2 shots per week  Smokes cigarettes maybe 3 cigarettes/day  Denies any drug abuse at this time  Patient is Melody speaking but also speaks some English      In the past, CD treatment at [place] 3 days/week r  Drug use amphetamines heroin in the past  Note states that he has used cannabis in the past with abuse  Has had a history of inhalant induced psychotic disorder with hallucinations  No history of alcohol withdrawal seizures per chart  Uses betel nut (per reports can have stimulant effect, habit-forming, carcinogenic, can cause psychosis tachycardia and elevated blood pressure)           Work-up June 2019  CT scan head June 2018 normal  MRI scan brain June 2018 with and without contrast normal  EEG June 2019 intubated sedated 3-4 Hz and 5-6 Hz diffuse slowing no epileptiform activity  CSF June 2019, WBC 3/2, RBC 0/0, protein 21, glucose 84, PCR negative for HSV, varicella, enterovirus  Recent work-up August 2020  Keppra level 8.98 August 14, 2020  TSH 1.03  Labs August 8, 2020  Sodium 137 potassium 3.9 BUN 6 creatinine 1.38  Glucose 147  AST 26  White blood count 8.3, hemoglobin 13.9, platelets 346,000  EEG November 10, 2020 10 Hz posterior dominant rhythm normal awake and drowsy record  AED levels                    6/17/2019  8/14/2020   11/10/2020  Keppra       13.8              8.9              11.3                        4/2023  NA/K          140/3.8  BUN/Cr       12/1.22  GLU            123  ALT             62  HGBA1C     6.6        Exam     Review of systems  No headache no chest pain or shortness of breath no nausea vomiting no diarrhea no fever chills no abdominal pain    No seizures    No diplopia no dysarthria no dysphagia  No focal weakness no numbness no tingling no ataxia    Otherwise review systems negative        Exam  Blood pressure 113/78, pulse 68  Alert and attentive oriented per   HEENT normal  Lungs clear  Heart rate regular  Abdomen  soft positive bowel sound  Symmetrical pulses   No edema in the feet      Neurologic exam  Alert oriented x3  Normal prosody of speech  Normal naming  Normal comprehension  Normal repetition  No aphasia  Patient was seen with the   No neglect  Memory recall okay    Cranials 2 through 12 normal  No ophthalmoplegia  No nystagmus  Pupils round reactive to light symmetrical  Visual fields intact  Tongue twisters are good  Face symmetrical    Upper extremities  No drift no tremor normal finger-nose    Lower extremities distal proximal strength good    Gait  Gets up from the chair with his arms crossed  Negative Romberg  Normal ambulation      Neuro exam normal          Impression    1.  First-time seizure June 2019 presented with 4 seizures respiratory difficulty thought to be triggered by may be methamphetamine abuse at the time.  Second seizure August 4, 2020    2.  Started on Keppra 1000 mg twice daily 8 June 2019    3.  Supposedly had a shaking spell amnestic for the event August 8, 2020 breakthrough seizure,        Keppra level not done until a week later though and was 8.9    Recommend/plan    Epilepsy  Keppra 1000 mg p.o. twice daily      States that he does not miss doses  Has been seizure-free      Follow-up at least on a yearly basis  Medication refilled    Follow-up on a yearly basis    32 minutes total care time today    Addendum 6/12/2024  Called and spoke with Fausto nurse at the [facility] [place].  She states she does not believe pt has been taking medications as prescribed as he has not been asking for meds to be refilled.  They were setting up meds for him, but when pt went through [facility] program, one of the requirements is to self administer meds, so pt now self administers. Even when they were doing medication set up, he was not compliant.  Pt currently lives at home with family.  No known seizures, nothing reported by family to nursing staff either.  Nurse wanted you to be aware in case  you wanted to make any changes or discontinue the medication as he appears to be taking it sporadically if at all.     Patient should keep follow-up visit 8/30/2024

## 2023-08-30 ENCOUNTER — OFFICE VISIT (OUTPATIENT)
Dept: NEUROLOGY | Facility: CLINIC | Age: 28
End: 2023-08-30
Payer: COMMERCIAL

## 2023-08-30 VITALS
DIASTOLIC BLOOD PRESSURE: 78 MMHG | SYSTOLIC BLOOD PRESSURE: 113 MMHG | HEIGHT: 61 IN | BODY MASS INDEX: 29.07 KG/M2 | WEIGHT: 154 LBS | HEART RATE: 68 BPM

## 2023-08-30 DIAGNOSIS — G40.009 PARTIAL IDIOPATHIC EPILEPSY WITH SEIZURES OF LOCALIZED ONSET, NOT INTRACTABLE, WITHOUT STATUS EPILEPTICUS (H): Primary | ICD-10-CM

## 2023-08-30 PROCEDURE — 99214 OFFICE O/P EST MOD 30 MIN: CPT | Performed by: PSYCHIATRY & NEUROLOGY

## 2023-08-30 RX ORDER — METFORMIN HCL 500 MG
TABLET, EXTENDED RELEASE 24 HR ORAL
COMMUNITY
Start: 2023-04-19 | End: 2024-05-11

## 2023-08-30 RX ORDER — LEVETIRACETAM 1000 MG/1
1000 TABLET ORAL 2 TIMES DAILY
Qty: 60 TABLET | Refills: 11 | Status: SHIPPED | OUTPATIENT
Start: 2023-08-30 | End: 2024-08-30

## 2023-08-30 NOTE — LETTER
"    8/30/2023         RE: Moose Sanchez  [address] [place] [address]        Dear Colleague,    Thank you for referring your patient, Moose Sanchez, to the [facility] [place]. Please see a copy of my visit note below.    In person evaluation  With  to help with visit    HPI  9/11/2020, in person visit  11/10/2020, in person visit  5/9/2022, in person visit  8/30/2023, in person visit      27-year-old being evaluated neurologically for:  Epilepsy  History of substance abuse  History of psychiatric disease with psychosis      Since last seen about 16 months ago  No hospitalizations  No surgeries  No major illnesses  No seizures    Does not work  Watches TV during the day  No longer doing drugs or alcohol  Does still use the \"betel nut\"    Lives with parents  Eating okay  Remembers his medication  Has not missed doses        A.  Epilepsy       Patient who originally  seen in June 2019  in hospital       Had 4 seizures during that hospitalization thought to be triggered by polysubstance abuse       Was supposedly discharged on Keppra 1000 mg twice daily       Never came back for follow-up         further breakthrough seizure August 8, 2020       Per family when shaking       patient amnestic for the event       August 14, 2020, Keppra level 8.9      B.  History of polysubstance abuse       Alcohol/methamphetamine/marijuana in the past       Past history of alcohol use 2 to 4/week or month unknown current consumption       In the past, CD treatment at [place] 3 days/week         Drug use amphetamines heroin in the past        Note states that he has used cannabis in the past with abuse       Has had a history of inhalant induced psychotic disorder with hallucinations       No history of alcohol withdrawal seizures per chart       Uses betel nut (per reports can have stimulant effect, habit-forming, carcinogenic, can cause psychosis tachycardia and elevated blood pressure)       C.  " History of psychosis            Past medical history  Psych history with psychosis  Polysubstance abuse (alcohol, methamphetamines, marijuana)  History of hepatitis B  First-time seizure times 4 June 2019 hospitalized at [facility]  Alcohol 4 beers per week, 2 shots per week  Smokes cigarettes maybe 3 cigarettes/day  Denies any drug abuse at this time  Patient is Melody speaking but also speaks some English      In the past, CD treatment at [place] 3 days/week r  Drug use amphetamines heroin in the past  Note states that he has used cannabis in the past with abuse  Has had a history of inhalant induced psychotic disorder with hallucinations  No history of alcohol withdrawal seizures per chart  Uses betel nut (per reports can have stimulant effect, habit-forming, carcinogenic, can cause psychosis tachycardia and elevated blood pressure)           Work-up June 2019  CT scan head June 2018 normal  MRI scan brain June 2018 with and without contrast normal  EEG June 2019 intubated sedated 3-4 Hz and 5-6 Hz diffuse slowing no epileptiform activity  CSF June 2019, WBC 3/2, RBC 0/0, protein 21, glucose 84, PCR negative for HSV, varicella, enterovirus  Recent work-up August 2020  Keppra level 8.98 August 14, 2020  TSH 1.03  Labs August 8, 2020  Sodium 137 potassium 3.9 BUN 6 creatinine 1.38  Glucose 147  AST 26  White blood count 8.3, hemoglobin 13.9, platelets 346,000  EEG November 10, 2020 10 Hz posterior dominant rhythm normal awake and drowsy record  AED levels                    6/17/2019  8/14/2020   11/10/2020  Keppra       13.8              8.9              11.3                        4/2023  NA/K          140/3.8  BUN/Cr       12/1.22  GLU            123  ALT             62  HGBA1C     6.6        Exam     Review of systems  No headache no chest pain or shortness of breath no nausea vomiting no diarrhea no fever chills no abdominal pain    No seizures    No diplopia no dysarthria no dysphagia  No focal  weakness no numbness no tingling no ataxia    Otherwise review systems negative        Exam  Blood pressure 113/78, pulse 68  Alert and attentive oriented per   HEENT normal  Lungs clear  Heart rate regular  Abdomen soft positive bowel sound  Symmetrical pulses   No edema in the feet      Neurologic exam  Alert oriented x3  Normal prosody of speech  Normal naming  Normal comprehension  Normal repetition  No aphasia  Patient was seen with the   No neglect  Memory recall okay    Cranials 2 through 12 normal  No ophthalmoplegia  No nystagmus  Pupils round reactive to light symmetrical  Visual fields intact  Tongue twisters are good  Face symmetrical    Upper extremities  No drift no tremor normal finger-nose    Lower extremities distal proximal strength good    Gait  Gets up from the chair with his arms crossed  Negative Romberg  Normal ambulation      Neuro exam normal          Impression    1.  First-time seizure June 2019 presented with 4 seizures respiratory difficulty thought to be triggered by may be methamphetamine abuse at the time.  Second seizure August 4, 2020    2.  Started on Keppra 1000 mg twice daily 8 June 2019    3.  Supposedly had a shaking spell amnestic for the event August 8, 2020 breakthrough seizure,        Keppra level not done until a week later though and was 8.9    Recommend/plan    Epilepsy  Keppra 1000 mg p.o. twice daily      States that he does not miss doses  Has been seizure-free      Follow-up at least on a yearly basis  Medication refilled    Follow-up on a yearly basis    32 minutes total care time today                Again, thank you for allowing me to participate in the care of your patient.        Sincerely,        tavia Ennis MD   no

## 2023-08-30 NOTE — NURSING NOTE
Chief Complaint   Patient presents with    Seizures     Annual follow up. Pt denies any seizures.     Araceli Garcia LPN on 8/30/2023 at 10:36 AM

## 2023-10-09 ENCOUNTER — TRANSFERRED RECORDS (OUTPATIENT)
Dept: HEALTH INFORMATION MANAGEMENT | Facility: CLINIC | Age: 28
End: 2023-10-09
Payer: COMMERCIAL

## 2023-10-12 ENCOUNTER — TELEPHONE (OUTPATIENT)
Dept: BEHAVIORAL HEALTH | Facility: CLINIC | Age: 28
End: 2023-10-12
Payer: COMMERCIAL

## 2023-10-12 NOTE — TELEPHONE ENCOUNTER
"  October 12, 2023    Referral Medical Screening:  Per client self report, using    Pt's counseller at [place] also assisting to provide information as well.   Aman: [phone]  [email or web address]     1) Medications?  Pt reports taking Insulin, Does not know what kind, Pt says he gets once a month shot of insulin at the [facility]. Pt does not know the names of his other meds. Pt's mother helps him with medications at home. Pt states he knows when to take his meds and can take them independently at .     Per paperwork pt also takes Metformin, Abilify, Famotidine, and Levetiracetam        For hospital or any facility \"door to door\"...Nurse to nurse report required and \"too soon to refill\" medication issues must be resolved prior to pt admission.  We have no rounding provider at [facility] to asess medical issues or trouble shoot medication issues.    2) Medical conditions?  DM Type II - new diagnosis, Pt does not know the names of his other medical conditions, pt has a \"liver problem\". Paperwork lists Hep B. When asked, pt states he had a seizure once, states it was 1-2 years ago  Pt goes to [facility] - pt does not know the name of his provider who orders his medications      Respiratory Condition? (Asthma, COPD, RAD, Bronchitis, Emphysema, Cystic fibrosis, RAMON, etc.)  None  If yes what kind? N/A  What medications or equipment do you use? (Nebulizer, inhalers, CPAP, BiPap...etc) None     Writer informed patient they need to bring all respiratory equipment / medications in order to [facility]    3) Independent with ADL'S?  Yes     4) Assistive devices (ambulatory, orthotics, hearing, c-pap etc.)?  None    5) Fall risk?  None    6) Pain management concerns?  None    7) Dental health concerns?  None    8) Skin integrity concerns (wounds, rash, etc)?  None    9) Infectious disease concerns (MRSA, ESBL, VRE, C-Diff, TB, etc.)  None    10) Mental health " concerns /suicidal ideation?  None per pt. Chart states pt has Schizophrenia, Denies SI    11) COVID-19 Symptom Screening Tool:     Do you have any of the following NEW or worsening symptoms NOT attributed to pre-existing conditions?    No,     Fever of 100.0  F (37.8 C) or over  Chills  Cough  Shortness of Breath  Loss of taste or smell  Generalized body aches  Persistent headache  Sore throat (or trouble eating or drinking in young children?)  Nausea, Vomiting, or diarrhea (loose stools)    Did you test positive for COVID-19 in the last 14  days or are you waiting on the test results due to an exposure or symptoms?  No    Has anyone told you to self-quarantine due to exposure to someone with COVID-19?  No     Positive screen - LPRN to reach out to [facility] Infection Prevention for advisement/recommendations     Based on above assessment: Client does NOT meet medical criteria for admission to [facility] yet. Pt needs to provide name of PCP and and FERN so diabetic competency can be filled out.      IS THIS PT CONSIDERED COMPLEX? No,       IF patient is APPROVED for Admission to LP, they are informed of the following (below) by LPRN:    1) No drug or alcohol use for a minimum of 24 hours prior to admission to LP.  2) Should you acquire COVID or influenza symptoms, you may not admit to LP.  Call LPRN PRIOR TO ADMISSION for assessment / recommendations at [phone].  3)Structured outdoor activities may involve walking several city blocks. If you think you may have difficulty safely navigating this distance please speak with the LP Nurse.    4) Bring 30 day supply of all medications.  All OTC's must be sealed for use at   5) All medication issues must be resolved prior to admission.  We have no rounding provider at [facility] to assess medical issues or trouble shoot medication issues.    [facility]  Nurse Liaison / CD Adult [facility]  O: [phone]  Fax:[phone]  LPRN  Pool [id number]  M-F: 7AM to 5:30PM   Sat-Sun: 7AM to 3PM  After hours: [phone]

## 2023-10-12 NOTE — TELEPHONE ENCOUNTER
Date: 10/12/2023    To: RONN RN    Please call this patient at [phone] to complete a medical screening to clarify his medications and medical condition and to complete a COVID-19 screening.      The patient has a history of:  diabetes    OUTSIDE: This is an outside referral so I will tube up the assessment and any other clinical documentation to the 6th floor.     Thanks,  Parminder Govea [facility]

## 2023-10-12 NOTE — TELEPHONE ENCOUNTER
Writer received a [facility]. Writer contacted  services to get the [facility]  on the line. Pt was transferred to LP RN for medical screen.

## 2023-10-13 NOTE — TELEPHONE ENCOUNTER
Pt's primary RN from [facility] called back she said pt is NOT taking insulin but does get monthly Abilify injections. Per RN pt has been tab;e on these and has been compliant. Pt's Psychiatrist Dr Peters will be back on Tuesday next week and admissions counselor can call RN to get connected with this provider [phone].

## 2023-10-13 NOTE — TELEPHONE ENCOUNTER
From chart review it looks like pt is only taking oral meds for DM type 2 but is taking once month Abilify injection. Writer left VM with [facility] staff Nurse to clarify and is waiting for a call back.

## 2023-10-17 NOTE — TELEPHONE ENCOUNTER
Writer spoke with pt's medical provider   Pt on Abilify injection monthly . 10/10 /23 last injection   Next injection nov 10th-but can do it up to one week later      Dr Peetrs [phone] medical provider through ACT team-They can be very involved and help with pt's medical and mental health needs while at LP    Pt is not on insulin, only takes metformin and per Provider Dr Peters does not currently check his blood sugars and haim not need to while at [facility]     will work on 10/19 to coordinate with [facility] psych 2nd floor to see if they could adminester medication for pt in their clinic while he is a pt a LP.    Pt is medically approved at this time. See LP admissions note concerning mental health symptoms.

## 2023-10-17 NOTE — TELEPHONE ENCOUNTER
Dr. Peters called writer back, he stated pt is doing well psychiatrically when not abusing Meth and/or Alcohol. Pt is currently prescribed Abilify 1x per month through injection. Transferred Dr. Peters to LP RN to discuss how LP can accommodate this injection.     Pt does well in groups at [place] when not using, however has some difficulty with recall.

## 2023-10-18 NOTE — TELEPHONE ENCOUNTER
Writer spoke with pt recovery counselor Aman Willett [phone]. Tentatively scheduled pt for 11/3 at 10 am. Writer gave Aman the address for pt to arrive to. Aman was also advised that pt must be 24 hours sober prior to admit.

## 2023-10-19 NOTE — TELEPHONE ENCOUNTER
Writer will discuss pt's Abilify injection with medical director next Tuesday to set up a plan for how and where  pt will receive this.

## 2023-10-24 NOTE — TELEPHONE ENCOUNTER
Writer spoke with Fausto and pt's [facility] Care manager Aman. Writer has asked that we pause on patients admission while we gather more information on his ability to be independent with his cares. Can pt do the [facility] program. Please call care manager to discuss they are meeting with pt tomorrow. LP Admissions can you call Aman Willett [phone]

## 2023-10-24 NOTE — TELEPHONE ENCOUNTER
Writer spoke with pt's RN at [facility] [phone]    RN explained that they currently do all med set up for pt and that he has been resistant in the past to learn how to do this himself. She was made aware that he would need to be independent with all of his cares while at  including his med self administration. RN said she would work with him on this and would call LPRN back to discuss. Writer alerted RN that pt would not be able to admit if he was not able to learn how to self admin his meds. Writer also alerted RN that pt would need to admit with 30 day supply of metformin and keppra.

## 2023-10-24 NOTE — TELEPHONE ENCOUNTER
[phone] writer left GRIFFIN requesting call back to coordinate care for this pt. Pt will need to have ACT team assist him wit getting his Abilify injection on or around 11/10. Writer also wants to clarify pt's medications. Pt is tentatively scheduled for admit on 11/3

## 2023-11-03 ENCOUNTER — TELEPHONE (OUTPATIENT)
Dept: ADDICTION MEDICINE | Facility: CLINIC | Age: 28
End: 2023-11-03

## 2023-11-03 NOTE — TELEPHONE ENCOUNTER
Writer spoke with Recovery Counselor Aman MARQUEZ At [facility]. Pt will be placed on LP wait list to hold his spot, when it is closer to an admit date/time, will rescreen with LP RNs.

## 2023-11-03 NOTE — TELEPHONE ENCOUNTER
Pt and patel staff [facility] and  Aman called to speak with RN, pt's outside psychiatrist had also called to speak with admissions counselor earlier in the day. Per reports from staff working with pt and pt he is now independent with his medication set up and administration. Pt has his med bottles labeled  with pictures to tell him when to take them. Per pt he has not heard voices in years is completely independent at home, has not used drugs or alcohol in 2 weeks. Pt and team were made aware pt would need to have Abilify injection before admission  or after he discharges from LP.     Pt's care team was notified that LPRN and LP admissions would be discussing if pt would be appropriate to admit. Lp admissions please call Pt's case work Aman today.

## 2023-12-27 NOTE — TELEPHONE ENCOUNTER
Aman from the [facility] called our wait list phone number.     Writer called Aman back. Will need updated assessment documenting any changes since the last assessment completed 10/9/23. Pt has been working with his ACT team to learn how to do his medications independently. He has been living in an unsupportive environment for recovery, homeless at times.     Pt is still on the LP wait list, pending screening from RN and Admissions.

## 2024-05-02 ENCOUNTER — TELEPHONE (OUTPATIENT)
Dept: NEUROLOGY | Facility: CLINIC | Age: 29
End: 2024-05-02
Payer: COMMERCIAL

## 2024-05-02 NOTE — TELEPHONE ENCOUNTER
Health Call Center    Phone Message    May a detailed message be left on voicemail: yes     Reason for Call: MARGE Obregon at [facility] called requesting to speak to  or nurse to discuss patients seizures and if there's a sz action plan for patient. Moose is a new patient at their facility and patient was not able to answer questions regarding his seizures, types of seizure and duration. Please call back to the nursing line at [phone]    Action Taken: Other: [facility] neurology    Travel Screening: Not Applicable

## 2024-05-03 NOTE — TELEPHONE ENCOUNTER
Called and spoke with MARGE Obregon, discussing patient seizure history and medication management (no recent seizures noted).     Educated Mindi, that ensuring seizure medications are taken accurately is of utmost importance, and in the event of patient having a seizure, the clinic can be called to report specifics of the seizure (when it happen, duration, etc), and then our staff would consult provider about any necessary steps or actions (lab work needed, etc.).      Mindi appreciated the call back and insights/education. They will update our clinic as needed.    Sidney Davis, RN, BSN  [facility] Neurology

## 2024-06-07 ENCOUNTER — TELEPHONE (OUTPATIENT)
Dept: NEUROLOGY | Facility: CLINIC | Age: 29
End: 2024-06-07
Payer: COMMERCIAL

## 2024-06-07 NOTE — TELEPHONE ENCOUNTER
[facility] Call Center    Phone Message    May a detailed message be left on voicemail: no     Reason for Call: Medication Question or concern regarding medication   Prescription Clarification  Name of Medication:   levETIRAcetam (KEPPRA) 1000 MG tablet     Prescribing Provider:   Howard Ennis     Pharmacy: NA    What on the order needs clarification? Patients nurse at the [facility] would like to make provider aware that patient has not been very compliant with the medication listed above.  Fausto does not believe that pt has been taking his medication on a regular basis.   Please reach out with any questions  [phone]  Fausto Erwin Nurse at [facility]        Action Taken: Other: Neurology    Travel Screening: Not Applicable     Date of Service:

## 2024-06-10 NOTE — TELEPHONE ENCOUNTER
Left message for Fausto, asking for more  information. Has pt had any seizures that she is aware of? Does pt take medications on his own? Are meds set up?    Araceli Garcia LPN on 6/10/2024 at 9:17 AM

## 2024-06-12 NOTE — TELEPHONE ENCOUNTER
Called and spoke to Fausto, the nurse from [place], relayed Dr. Ennis message below.  No further questions art this time.    Araceli Garcia LPN on 6/12/2024 at 3:48 PM

## 2024-06-12 NOTE — TELEPHONE ENCOUNTER
Called and spoke with Fausto nurse at the [facility].  She states she does not believe pt has been taking medications as prescribed as he has not been asking for meds to be refilled.  They were setting up meds for him, but when pt went through chem dep program, one of the requirements is to self administer meds, so pt now self administers. Even when they were doing medication set up, he was not compliant.  Pt currently lives at home with family.  No known seizures, nothing reported by family to nursing staff either.  Nurse wanted you to be aware in case you wanted to make any changes or discontinue the medication as he appears to be taking it sporadically if at all.    Araceli Garcia LPN on 6/12/2024 at 10:37 AM

## 2024-06-12 NOTE — TELEPHONE ENCOUNTER
Patient should keep follow-up visit 8/30/2024 I am not making any changes in my recommendations.  tavia Ennis MD on 6/12/2024 at 3:34 PM

## 2024-08-28 NOTE — PROGRESS NOTES
"In person evaluation  With  to help with visit    HPI  9/11/2020, in person visit  11/10/2020, in person visit  5/9/2022, in person visit  8/30/2023, in person visit  8/30/2024, in person visit        28-year-old being evaluated neurologically for:  Epilepsy  History of substance abuse  History of psychiatric disease with psychosis      Last seen a year ago August 2023  No seizures reported  Did go through chemical dependency treatment  Is treated by psychiatry for his psych disease  Has some hepatitis to be followed by primary MD    Since August 2023  Patient did go through chemical dependency and as part of that was supposed to learn how to do self monitoring of his medication  Even when he was having his meds monitored though he was often noncompliant    6/12/2024  Called and spoke with Fausto nurse at the [facility].  She states she does not believe pt has been taking medications as prescribed as he has not been asking for meds to be refilled.  They were setting up meds for him, but when pt went through chem dep program, one of the requirements is to self administer meds, so pt now self administers. Even when they were doing medication set up, he was not compliant.  Pt currently lives at home with family.  No known seizures, nothing reported by family to nursing staff either.  Nurse wanted you to be aware in case you wanted to make any changes or discontinue the medication as he appears to be taking it sporadically if at all.      Patient does not work  Says that he is no longer doing drugs or alcohol    Does still use the \"betel nut\" 1-2 times per day    Lives with parents  Eating okay    Has some mild resting tremor on the right side may be from his psych meds parkinsonian side effect from medication    I impressed upon the patient that is important for him to take the medication regularly to prevent breakthrough seizures in the future but also to remain off of any drugs or alcohol      A.  " Epilepsy       Patient who originally  seen in June 2019  in hospital       Had 4 seizures during that hospitalization thought to be triggered by polysubstance abuse       Was supposedly discharged on Keppra 1000 mg twice daily       Never came back for follow-up         further breakthrough seizure August 8, 2020       Per family when shaking       patient amnestic for the event       August 14, 2020, Keppra level 8.9      B.  History of polysubstance abuse       Alcohol/methamphetamine/marijuana in the past       Past history of alcohol use 2 to 4/week or month unknown current consumption       In the past, CD treatment at [place] 3 days/week         Drug use amphetamines heroin in the past        Note states that he has used cannabis in the past with abuse       Has had a history of inhalant induced psychotic disorder with hallucinations       No history of alcohol withdrawal seizures per chart       Uses betel nut (per reports can have stimulant effect, habit-forming, carcinogenic, can cause psychosis tachycardia and elevated blood pressure)       C.  History of psychosis            Past medical history  Psych history with psychosis  Polysubstance abuse (alcohol, methamphetamines, marijuana)  History of hepatitis B  First-time seizure times 4 June 2019 hospitalized at [facility]  Alcohol 4 beers per week, 2 shots per week  Smokes cigarettes maybe 3 cigarettes/day  Denies any drug abuse at this time  Patient is Melody speaking but also speaks some English      In the past, CD treatment at [facility] 3 days/week r  Drug use amphetamines heroin in the past  Note states that he has used cannabis in the past with abuse  Has had a history of inhalant induced psychotic disorder with hallucinations  No history of alcohol withdrawal seizures per chart  Uses betel nut (per reports can have stimulant effect, habit-forming, carcinogenic, can cause psychosis tachycardia and elevated blood pressure)           Work-up  June 2019  CT scan head June 2018 normal  MRI scan brain June 2018 with and without contrast normal  EEG June 2019 intubated sedated 3-4 Hz and 5-6 Hz diffuse slowing no epileptiform activity  CSF June 2019, WBC 3/2, RBC 0/0, protein 21, glucose 84, PCR negative for HSV, varicella, enterovirus  Recent work-up August 2020  Keppra level 8.98 August 14, 2020  TSH 1.03  Labs August 8, 2020  Sodium 137 potassium 3.9 BUN 6 creatinine 1.38  Glucose 147  AST 26  White blood count 8.3, hemoglobin 13.9, platelets 346,000  EEG November 10, 2020 10 Hz posterior dominant rhythm normal awake and drowsy record  AED levels                    6/17/2019  8/14/2020   11/10/2020  Keppra       13.8              8.9              11.3                        4/2023 11/2023  NA/K          140/3.8  BUN/Cr       12/1.22  GLU            123  ALT             62               81  HGBA1C     6.6              6.3  TSH                               1.91  Hepatitis B                     positive    Exam     Review of systems  No headache no chest pain no shortness of breath no nausea vomiting no diarrhea no fever chills  No seizures    No diplopia no dysarthria no dysphagia  No focal weakness no numbness or tingling    May have a little bit of blurriness in the left eye    Has a little bit of resting tremor more so on the right could be from his psych meds  Gait is okay    Otherwise review of systems negative          Exam  Blood pressure 112/68, pulse 98  Alert and attentive oriented per   HEENT normal  Lungs clear  Heart rate regular  Abdomen soft positive bowel sound  Symmetrical pulses   No edema in the feet      Neurologic exam  Alert oriented x3  Normal prosody of speech  Normal naming  Normal comprehension  Normal repetition  No aphasia  Patient was seen with the   No neglect  Memory recall okay    Cranials 2 through 12 normal  No ophthalmoplegia  No nystagmus  Pupils round reactive to light symmetrical  Visual  fields intact  Tongue twisters are good  Face symmetrical    Upper extremities  No drift   Mild resting tremor in the right hand with distraction  Relatively normal range of motion no bradykinesia    Lower extremities   Distal proximal strength good    Gait  Gets up from the chair with his arms crossed  Negative Romberg  Reasonable arm swing when he walks out to the sink and back    Neuroexam stable          Impression    1.  First-time seizure June 2019 presented with 4 seizures respiratory difficulty thought to be triggered by may be methamphetamine abuse at the time.         Second seizure August 4, 2020    2.  Started on Keppra 1000 mg twice daily 8 June 2019    3.  Supposedly had a shaking spell amnestic for the event August 8, 2020 breakthrough seizure,        Keppra level not done until a week later though and was 8.9    Recommend/plan    Epilepsy  Keppra 1000 mg p.o. twice daily    Mild parkinsonian symptoms from psych meds subtle does not affect gait and mobility    I discussed with the patient at length that it is important for him to take his medication regularly to prevent trouble  It is also important for him to stay off of the drugs and alcohol    Will have him follow-up in 1 year  I refilled the medication  Discussed issues through the     Total care time today 30 minutes  The longitudinal plan of care for the diagnosis(es)/condition(s) as documented were addressed during this visit. Due to the added complexity in care, I will continue to support Win in the subsequent management and with ongoing continuity of care.        As part of visit today  Reviewed EMR notes

## 2024-08-30 ENCOUNTER — OFFICE VISIT (OUTPATIENT)
Dept: NEUROLOGY | Facility: CLINIC | Age: 29
End: 2024-08-30
Payer: COMMERCIAL

## 2024-08-30 VITALS
BODY MASS INDEX: 27.38 KG/M2 | SYSTOLIC BLOOD PRESSURE: 112 MMHG | HEART RATE: 98 BPM | HEIGHT: 61 IN | DIASTOLIC BLOOD PRESSURE: 68 MMHG | WEIGHT: 145 LBS

## 2024-08-30 DIAGNOSIS — G40.009 PARTIAL IDIOPATHIC EPILEPSY WITH SEIZURES OF LOCALIZED ONSET, NOT INTRACTABLE, WITHOUT STATUS EPILEPTICUS (H): Primary | ICD-10-CM

## 2024-08-30 PROCEDURE — G2211 COMPLEX E/M VISIT ADD ON: HCPCS | Performed by: PSYCHIATRY & NEUROLOGY

## 2024-08-30 PROCEDURE — 99214 OFFICE O/P EST MOD 30 MIN: CPT | Performed by: PSYCHIATRY & NEUROLOGY

## 2024-08-30 RX ORDER — LEVETIRACETAM 1000 MG/1
1000 TABLET ORAL 2 TIMES DAILY
Qty: 60 TABLET | Refills: 11 | Status: SHIPPED | OUTPATIENT
Start: 2024-08-30

## 2024-08-30 NOTE — NURSING NOTE
Chief Complaint   Patient presents with    Seizures     Annual follow up. Pt states he has not had any seizures.     Araceli Garcia LPN on 8/30/2024 at 12:17 PM

## 2024-08-30 NOTE — LETTER
"8/30/2024      Moose Sanchez  [address] [place] [address]      Dear Colleague,    Thank you for referring your patient, Moose Sanchez, to the [facility] [place]. Please see a copy of my visit note below.    In person evaluation  With  to help with visit    HPI  9/11/2020, in person visit  11/10/2020, in person visit  5/9/2022, in person visit  8/30/2023, in person visit  8/30/2024, in person visit        28-year-old being evaluated neurologically for:  Epilepsy  History of substance abuse  History of psychiatric disease with psychosis      Last seen a year ago August 2023  No seizures reported  Did go through chemical dependency treatment  Is treated by psychiatry for his psych disease  Has some hepatitis to be followed by primary MD    Since August 2023  Patient did go through chemical dependency and as part of that was supposed to learn how to do self monitoring of his medication  Even when he was having his meds monitored though he was often noncompliant    6/12/2024  Called and spoke with Fausto nurse at the [facility].  She states she does not believe pt has been taking medications as prescribed as he has not been asking for meds to be refilled.  They were setting up meds for him, but when pt went through chem dep program, one of the requirements is to self administer meds, so pt now self administers. Even when they were doing medication set up, he was not compliant.  Pt currently lives at home with family.  No known seizures, nothing reported by family to nursing staff either.  Nurse wanted you to be aware in case you wanted to make any changes or discontinue the medication as he appears to be taking it sporadically if at all.      Patient does not work  Says that he is no longer doing drugs or alcohol    Does still use the \"betel nut\" 1-2 times per day    Lives with parents  Eating okay    Has some mild resting tremor on the right side may be from his psych meds " parkinsonian side effect from medication    I impressed upon the patient that is important for him to take the medication regularly to prevent breakthrough seizures in the future but also to remain off of any drugs or alcohol      A.  Epilepsy       Patient who originally  seen in June 2019  in hospital       Had 4 seizures during that hospitalization thought to be triggered by polysubstance abuse       Was supposedly discharged on Keppra 1000 mg twice daily       Never came back for follow-up         further breakthrough seizure August 8, 2020       Per family when shaking       patient amnestic for the event       August 14, 2020, Keppra level 8.9      B.  History of polysubstance abuse       Alcohol/methamphetamine/marijuana in the past       Past history of alcohol use 2 to 4/week or month unknown current consumption       In the past, CD treatment at [facility] 3 days/week         Drug use amphetamines heroin in the past        Note states that he has used cannabis in the past with abuse       Has had a history of inhalant induced psychotic disorder with hallucinations       No history of alcohol withdrawal seizures per chart       Uses betel nut (per reports can have stimulant effect, habit-forming, carcinogenic, can cause psychosis tachycardia and elevated blood pressure)       C.  History of psychosis            Past medical history  Psych history with psychosis  Polysubstance abuse (alcohol, methamphetamines, marijuana)  History of hepatitis B  First-time seizure times 4 June 2019 hospitalized at [facility]  Alcohol 4 beers per week, 2 shots per week  Smokes cigarettes maybe 3 cigarettes/day  Denies any drug abuse at this time  Patient is Melody speaking but also speaks some English      In the past, CD treatment at [facility] 3 days/week r  Drug use amphetamines heroin in the past  Note states that he has used cannabis in the past with abuse  Has had a history of inhalant induced psychotic disorder with  hallucinations  No history of alcohol withdrawal seizures per chart  Uses betel nut (per reports can have stimulant effect, habit-forming, carcinogenic, can cause psychosis tachycardia and elevated blood pressure)           Work-up June 2019  CT scan head June 2018 normal  MRI scan brain June 2018 with and without contrast normal  EEG June 2019 intubated sedated 3-4 Hz and 5-6 Hz diffuse slowing no epileptiform activity  CSF June 2019, WBC 3/2, RBC 0/0, protein 21, glucose 84, PCR negative for HSV, varicella, enterovirus  Recent work-up August 2020  Keppra level 8.98 August 14, 2020  TSH 1.03  Labs August 8, 2020  Sodium 137 potassium 3.9 BUN 6 creatinine 1.38  Glucose 147  AST 26  White blood count 8.3, hemoglobin 13.9, platelets 346,000  EEG November 10, 2020 10 Hz posterior dominant rhythm normal awake and drowsy record  AED levels                    6/17/2019  8/14/2020   11/10/2020  Keppra       13.8              8.9              11.3                        4/2023 11/2023  NA/K          140/3.8  BUN/Cr       12/1.22  GLU            123  ALT             62               81  HGBA1C     6.6              6.3  TSH                               1.91  Hepatitis B                     positive    Exam     Review of systems  No headache no chest pain no shortness of breath no nausea vomiting no diarrhea no fever chills  No seizures    No diplopia no dysarthria no dysphagia  No focal weakness no numbness or tingling    May have a little bit of blurriness in the left eye    Has a little bit of resting tremor more so on the right could be from his psych meds  Gait is okay    Otherwise review of systems negative          Exam  Blood pressure 112/68, pulse 98  Alert and attentive oriented per   HEENT normal  Lungs clear  Heart rate regular  Abdomen soft positive bowel sound  Symmetrical pulses   No edema in the feet      Neurologic exam  Alert oriented x3  Normal prosody of speech  Normal naming  Normal  comprehension  Normal repetition  No aphasia  Patient was seen with the   No neglect  Memory recall okay    Cranials 2 through 12 normal  No ophthalmoplegia  No nystagmus  Pupils round reactive to light symmetrical  Visual fields intact  Tongue twisters are good  Face symmetrical    Upper extremities  No drift   Mild resting tremor in the right hand with distraction  Relatively normal range of motion no bradykinesia    Lower extremities   Distal proximal strength good    Gait  Gets up from the chair with his arms crossed  Negative Romberg  Reasonable arm swing when he walks out to the sink and back    Neuroexam stable          Impression    1.  First-time seizure June 2019 presented with 4 seizures respiratory difficulty thought to be triggered by may be methamphetamine abuse at the time.         Second seizure August 4, 2020    2.  Started on Keppra 1000 mg twice daily 8 June 2019    3.  Supposedly had a shaking spell amnestic for the event August 8, 2020 breakthrough seizure,        Keppra level not done until a week later though and was 8.9    Recommend/plan    Epilepsy  Keppra 1000 mg p.o. twice daily    Mild parkinsonian symptoms from psych meds subtle does not affect gait and mobility    I discussed with the patient at length that it is important for him to take his medication regularly to prevent trouble  It is also important for him to stay off of the drugs and alcohol    Will have him follow-up in 1 year  I refilled the medication  Discussed issues through the     Total care time today 30 minutes  The longitudinal plan of care for the diagnosis(es)/condition(s) as documented were addressed during this visit. Due to the added complexity in care, I will continue to support Win in the subsequent management and with ongoing continuity of care.        As part of visit today  Reviewed EMR notes                Again, thank you for allowing me to participate in the care of your patient.         Sincerely,        tavia Ennis MD